# Patient Record
Sex: FEMALE | Race: WHITE | NOT HISPANIC OR LATINO | Employment: OTHER | URBAN - METROPOLITAN AREA
[De-identification: names, ages, dates, MRNs, and addresses within clinical notes are randomized per-mention and may not be internally consistent; named-entity substitution may affect disease eponyms.]

---

## 2018-02-02 ENCOUNTER — OFFICE VISIT (OUTPATIENT)
Dept: FAMILY MEDICINE CLINIC | Facility: CLINIC | Age: 48
End: 2018-02-02
Payer: COMMERCIAL

## 2018-02-02 VITALS
BODY MASS INDEX: 33.49 KG/M2 | RESPIRATION RATE: 16 BRPM | HEIGHT: 67 IN | TEMPERATURE: 97.9 F | SYSTOLIC BLOOD PRESSURE: 138 MMHG | HEART RATE: 62 BPM | WEIGHT: 213.4 LBS | DIASTOLIC BLOOD PRESSURE: 82 MMHG

## 2018-02-02 DIAGNOSIS — R39.15 URINARY URGENCY: Primary | ICD-10-CM

## 2018-02-02 DIAGNOSIS — R53.83 FATIGUE, UNSPECIFIED TYPE: ICD-10-CM

## 2018-02-02 DIAGNOSIS — R31.29 MICROSCOPIC HEMATURIA: ICD-10-CM

## 2018-02-02 DIAGNOSIS — Z13.220 SCREENING FOR CHOLESTEROL LEVEL: ICD-10-CM

## 2018-02-02 DIAGNOSIS — R11.0 NAUSEA: ICD-10-CM

## 2018-02-02 DIAGNOSIS — R10.13 DYSPEPSIA: ICD-10-CM

## 2018-02-02 LAB
SL AMB  POCT GLUCOSE, UA: ABNORMAL
SL AMB LEUKOCYTE ESTERASE,UA: ABNORMAL
SL AMB POCT BILIRUBIN,UA: ABNORMAL
SL AMB POCT BLOOD,UA: ABNORMAL
SL AMB POCT CLARITY,UA: ABNORMAL
SL AMB POCT COLOR,UA: YELLOW
SL AMB POCT KETONES,UA: ABNORMAL
SL AMB POCT NITRITE,UA: ABNORMAL
SL AMB POCT PH,UA: 6
SL AMB POCT SPECIFIC GRAVITY,UA: 1.01
SL AMB POCT TOTAL PROTEIN: ABNORMAL
SL AMB POCT UROBILINOGEN: 0.2

## 2018-02-02 PROCEDURE — 99203 OFFICE O/P NEW LOW 30 MIN: CPT | Performed by: FAMILY MEDICINE

## 2018-02-02 PROCEDURE — 81002 URINALYSIS NONAUTO W/O SCOPE: CPT | Performed by: FAMILY MEDICINE

## 2018-02-02 RX ORDER — FAMOTIDINE 20 MG/1
20 TABLET, FILM COATED ORAL
Qty: 30 TABLET | Refills: 0 | COMMUNITY
Start: 2018-02-02

## 2018-02-02 RX ORDER — FAMOTIDINE 40 MG/1
20 TABLET, FILM COATED ORAL DAILY
COMMUNITY
End: 2018-02-02 | Stop reason: DRUGHIGH

## 2018-02-02 RX ORDER — CIPROFLOXACIN 500 MG/1
500 TABLET, FILM COATED ORAL 2 TIMES DAILY WITH MEALS
Qty: 14 TABLET | Refills: 0 | Status: SHIPPED | OUTPATIENT
Start: 2018-02-02 | End: 2018-02-09

## 2018-02-02 NOTE — PROGRESS NOTES
Assessment/Plan:    1  Urinary urgency/microscopic hematuria  - POCT urine dip  - ciprofloxacin (CIPRO) 500 mg tablet; Take 1 tablet (500 mg total) by mouth 2 (two) times a day with meals for 7 days  Dispense: 14 tablet; Refill: 0  - will obtain urine culture and call with results    2  Nausea  - CBC and differential; Future  - Comprehensive metabolic panel; Future  - CBC and differential  - Comprehensive metabolic panel    3  Dyspepsia  - famotidine (PEPCID) 20 mg tablet; Take 1 tablet (20 mg total) by mouth 2 (two) times a day before meals  Dispense: 30 tablet; Refill: 0  - avoid caffeine, spicy, fried or acidic food, ear small frequent meals, avoid eating 3 hours before bedtime    4  Screening for cholesterol level  - Lipid Panel with Direct LDL reflex; Future  - Lipid Panel with Direct LDL reflex       RTO in 2-3 weeks for yearly PE with labs done prior or sooner if symptoms persist or worsen  Subjective:      Patient ID: Mary Blue is a 50 y o  female  presents today with c/o intermittent epigastric/ left upper abdominal discomfort associated with nausea and belching for 2-3 weeks  She also developed urinary urgency and lower back discomfort 3 days ago, she had a low grade fever on Wednesday night 100 1, she has been having normal soft stools once daily, no pain or burning with urination, no urinary frequency, no vomiting, no diarrhea, no constipation, no melena/ hematochezia, no skin rash, no anorexia, no unexplained weight changes, no trouble swallowing, no hx of kidney stones  Pt states she started taking Pepcid yesterday with mild relief of her symptoms             The following portions of the patient's history were reviewed and updated as appropriate: allergies, current medications, past family history, past medical history, past social history, past surgical history and problem list     Review of Systems   Constitutional: Negative for activity change, appetite change, chills, fatigue and unexpected weight change  HENT: Negative for congestion, ear pain, nosebleeds, rhinorrhea, sore throat and trouble swallowing  Respiratory: Negative for cough, chest tightness, shortness of breath and wheezing  Cardiovascular: Negative for chest pain, palpitations and leg swelling  Gastrointestinal: Negative for blood in stool, constipation, diarrhea, nausea and vomiting  As noted in HPI   Genitourinary: Positive for urgency  Negative for difficulty urinating, dysuria, frequency, vaginal bleeding, vaginal discharge and vaginal pain  As noted in HPI   Skin: Negative for rash  Neurological: Negative for dizziness, syncope and headaches  Objective:  Vitals:    02/02/18 1510   BP: 138/82   BP Location: Left arm   Patient Position: Sitting   Cuff Size: Adult   Pulse: 62   Resp: 16   Temp: 97 9 °F (36 6 °C)   Weight: 96 8 kg (213 lb 6 4 oz)   Height: 5' 7" (1 702 m)      Physical Exam   Constitutional: She appears well-developed and well-nourished  No distress  HENT:   Right Ear: Tympanic membrane and external ear normal    Left Ear: Tympanic membrane and external ear normal    Nose: Nose normal    Mouth/Throat: Oropharynx is clear and moist    Neck: Neck supple  No thyromegaly present  Cardiovascular: Normal rate, regular rhythm and normal heart sounds  No murmur heard  Pulmonary/Chest: Effort normal and breath sounds normal  She has no wheezes  She has no rales  Abdominal: Soft  Bowel sounds are normal  She exhibits no distension and no mass  There is no tenderness  There is no rebound, no guarding and no CVA tenderness  Lymphadenopathy:     She has no cervical adenopathy  Skin: No rash noted

## 2018-02-04 LAB
BACTERIA UR CULT: NORMAL
Lab: NO GROWTH

## 2018-02-10 LAB
ALBUMIN SERPL-MCNC: 4.1 G/DL (ref 3.5–5.5)
ALBUMIN/GLOB SERPL: 1.6 {RATIO} (ref 1.2–2.2)
ALP SERPL-CCNC: 60 IU/L (ref 39–117)
ALT SERPL-CCNC: 13 IU/L (ref 0–32)
AST SERPL-CCNC: 17 IU/L (ref 0–40)
BASOPHILS # BLD AUTO: 0 X10E3/UL (ref 0–0.2)
BASOPHILS NFR BLD AUTO: 1 %
BILIRUB SERPL-MCNC: 0.9 MG/DL (ref 0–1.2)
BUN SERPL-MCNC: 17 MG/DL (ref 6–24)
BUN/CREAT SERPL: 16 (ref 9–23)
CALCIUM SERPL-MCNC: 9 MG/DL (ref 8.7–10.2)
CHLORIDE SERPL-SCNC: 102 MMOL/L (ref 96–106)
CHOLEST SERPL-MCNC: 142 MG/DL (ref 100–199)
CO2 SERPL-SCNC: 27 MMOL/L (ref 18–29)
CREAT SERPL-MCNC: 1.08 MG/DL (ref 0.57–1)
EOSINOPHIL # BLD AUTO: 0.2 X10E3/UL (ref 0–0.4)
EOSINOPHIL NFR BLD AUTO: 5 %
ERYTHROCYTE [DISTWIDTH] IN BLOOD BY AUTOMATED COUNT: 12.9 % (ref 12.3–15.4)
GLOBULIN SER-MCNC: 2.5 G/DL (ref 1.5–4.5)
GLUCOSE SERPL-MCNC: 87 MG/DL (ref 65–99)
HCT VFR BLD AUTO: 43.3 % (ref 34–46.6)
HDLC SERPL-MCNC: 36 MG/DL
HGB BLD-MCNC: 14.7 G/DL (ref 11.1–15.9)
IMM GRANULOCYTES # BLD: 0 X10E3/UL (ref 0–0.1)
IMM GRANULOCYTES NFR BLD: 0 %
LDLC SERPL CALC-MCNC: 94 MG/DL (ref 0–99)
LDLC/HDLC SERPL: 2.6 RATIO UNITS (ref 0–3.2)
LYMPHOCYTES # BLD AUTO: 1.4 X10E3/UL (ref 0.7–3.1)
LYMPHOCYTES NFR BLD AUTO: 30 %
MCH RBC QN AUTO: 31.7 PG (ref 26.6–33)
MCHC RBC AUTO-ENTMCNC: 33.9 G/DL (ref 31.5–35.7)
MCV RBC AUTO: 94 FL (ref 79–97)
MONOCYTES # BLD AUTO: 0.5 X10E3/UL (ref 0.1–0.9)
MONOCYTES NFR BLD AUTO: 11 %
NEUTROPHILS # BLD AUTO: 2.5 X10E3/UL (ref 1.4–7)
NEUTROPHILS NFR BLD AUTO: 53 %
PLATELET # BLD AUTO: 180 X10E3/UL (ref 150–379)
POTASSIUM SERPL-SCNC: 3.9 MMOL/L (ref 3.5–5.2)
PROT SERPL-MCNC: 6.6 G/DL (ref 6–8.5)
RBC # BLD AUTO: 4.63 X10E6/UL (ref 3.77–5.28)
SL AMB EGFR AFRICAN AMERICAN: 70 ML/MIN/1.73
SL AMB EGFR NON AFRICAN AMERICAN: 60 ML/MIN/1.73
SL AMB VLDL CHOLESTEROL CALC: 12 MG/DL (ref 5–40)
SODIUM SERPL-SCNC: 141 MMOL/L (ref 134–144)
TRIGL SERPL-MCNC: 62 MG/DL (ref 0–149)
TSH SERPL DL<=0.005 MIU/L-ACNC: 1.18 UIU/ML (ref 0.45–4.5)
WBC # BLD AUTO: 4.7 X10E3/UL (ref 3.4–10.8)

## 2018-02-16 ENCOUNTER — OFFICE VISIT (OUTPATIENT)
Dept: FAMILY MEDICINE CLINIC | Facility: CLINIC | Age: 48
End: 2018-02-16
Payer: COMMERCIAL

## 2018-02-16 VITALS
SYSTOLIC BLOOD PRESSURE: 118 MMHG | WEIGHT: 215.6 LBS | DIASTOLIC BLOOD PRESSURE: 64 MMHG | HEIGHT: 67 IN | BODY MASS INDEX: 33.84 KG/M2 | RESPIRATION RATE: 14 BRPM | HEART RATE: 72 BPM | TEMPERATURE: 97.9 F

## 2018-02-16 DIAGNOSIS — Z00.00 PE (PHYSICAL EXAM), ANNUAL: ICD-10-CM

## 2018-02-16 DIAGNOSIS — R31.9 HEMATURIA, UNSPECIFIED TYPE: Primary | ICD-10-CM

## 2018-02-16 LAB
SL AMB  POCT GLUCOSE, UA: NORMAL
SL AMB LEUKOCYTE ESTERASE,UA: NORMAL
SL AMB POCT BILIRUBIN,UA: NORMAL
SL AMB POCT BLOOD,UA: NORMAL
SL AMB POCT CLARITY,UA: CLEAR
SL AMB POCT COLOR,UA: YELLOW
SL AMB POCT KETONES,UA: NORMAL
SL AMB POCT NITRITE,UA: NORMAL
SL AMB POCT PH,UA: 5
SL AMB POCT SPECIFIC GRAVITY,UA: 1
SL AMB POCT URINE PROTEIN: NORMAL
SL AMB POCT UROBILINOGEN: 0.2

## 2018-02-16 PROCEDURE — 99396 PREV VISIT EST AGE 40-64: CPT | Performed by: NURSE PRACTITIONER

## 2018-02-16 PROCEDURE — 81002 URINALYSIS NONAUTO W/O SCOPE: CPT | Performed by: NURSE PRACTITIONER

## 2018-02-16 NOTE — PROGRESS NOTES
Chief Complaint   Patient presents with    Follow-up     still on bland diet- BW done 02/09/18     Assessment/Plan:    1  PE (physical exam), annual  This was conducted  And your blood work was reviewed  Please increase exercise to include strength training as well as cardio  Please have you r old records transferred and we will check to see TDaP date as well as next colon  2  Hematuria, unspecified type   we will hossein the urine for blood  If this is still present we will refer you to uro for evaluation  We will call with the results   - POCT urine dip  - Urine culture        Subjective:      Patient ID: Hema Alston is a 50 y o  female  Here today for general PE and to follow up on labs that were done  Admits that she watches her carbs but does not really exercise  Has a vary busy life and schedule and has always felt that was enough  Denies any fatigue   Had urine done last ov for abdominal pain and was started on Cipro for blood in the urine  Did take all of the cipro no menses  Is unsure lat TDaP but has records at home  Mom with stage 3 colon ca and pt did have 2 colonoscopies  Thinks she is due in 3-4 years for the next  No past medical history on file  Past Surgical History:   Procedure Laterality Date    DILATION AND CURETTAGE OF UTERUS       Family History   Problem Relation Age of Onset    Colon cancer Mother     Depression Mother     Substance Abuse Father      Social History   Social History     Social History    Marital status: /Civil Union     Spouse name: N/A    Number of children: N/A    Years of education: N/A     Occupational History    Not on file  Social History Main Topics    Smoking status: Never Smoker    Smokeless tobacco: Never Used    Alcohol use No    Drug use: No    Sexual activity: Not on file     Other Topics Concern    Not on file     Social History Narrative    No narrative on file     Review of Systems   Constitutional: Negative  HENT: Negative  Eyes: Negative  Respiratory: Negative  Cardiovascular: Negative  Gastrointestinal: Negative  Endocrine: Negative  Genitourinary: Negative  Musculoskeletal: Negative  Allergic/Immunologic: Negative  Neurological: Negative  Hematological: Negative  Vitals:    02/16/18 1356   BP: 118/64   BP Location: Right arm   Patient Position: Sitting   Cuff Size: Adult   Pulse: 72   Resp: 14   Temp: 97 9 °F (36 6 °C)   TempSrc: Oral   Weight: 97 8 kg (215 lb 9 6 oz)   Height: 5' 7" (1 702 m)       Objective:         Physical Exam   Constitutional: She is oriented to person, place, and time  She appears well-developed and well-nourished  Neck: Neck supple  Cardiovascular: Normal rate, regular rhythm and normal heart sounds  Pulmonary/Chest: Effort normal and breath sounds normal    Abdominal: Soft  Bowel sounds are normal    Musculoskeletal: Normal range of motion  Neurological: She is alert and oriented to person, place, and time  Skin: Skin is warm and dry  Psychiatric: She has a normal mood and affect   Her behavior is normal  Judgment and thought content normal

## 2018-02-18 LAB
BACTERIA UR CULT: NORMAL
Lab: NO GROWTH

## 2018-02-20 ENCOUNTER — TELEPHONE (OUTPATIENT)
Dept: FAMILY MEDICINE CLINIC | Facility: CLINIC | Age: 48
End: 2018-02-20

## 2018-02-20 NOTE — TELEPHONE ENCOUNTER
I sent it to nicole adam I thought she would know better the clinical information to print and send

## 2018-02-20 NOTE — TELEPHONE ENCOUNTER
Caller is from urology dr Berenice aguilar's office  She needs any office notes and tests done on pt   This can be faxed to [phone]

## 2018-02-28 ENCOUNTER — TRANSCRIBE ORDERS (OUTPATIENT)
Dept: ADMINISTRATIVE | Facility: HOSPITAL | Age: 48
End: 2018-02-28

## 2018-02-28 DIAGNOSIS — R31.29 MICROSCOPIC HEMATURIA: Primary | ICD-10-CM

## 2018-03-09 ENCOUNTER — HOSPITAL ENCOUNTER (OUTPATIENT)
Dept: ULTRASOUND IMAGING | Facility: CLINIC | Age: 48
Discharge: HOME | End: 2018-03-09
Payer: COMMERCIAL

## 2018-03-09 DIAGNOSIS — R31.29 MICROSCOPIC HEMATURIA: ICD-10-CM

## 2018-03-09 PROCEDURE — 76770 US EXAM ABDO BACK WALL COMP: CPT

## 2018-10-09 DIAGNOSIS — Z01.84 IMMUNITY STATUS TESTING: Primary | ICD-10-CM

## 2018-10-21 LAB
HBV SURFACE AB SER-ACNC: <3.1 MIU/ML
MEV IGG SER IA-ACNC: 121 AU/ML
MUV IGG SER IA-ACNC: <9 AU/ML
RUBV IGG SERPL IA-ACNC: 4.02 INDEX
VZV IGG SER IA-ACNC: 953 INDEX
VZV IGM SER IA-ACNC: <0.91 INDEX (ref 0–0.9)

## 2018-10-22 ENCOUNTER — TELEPHONE (OUTPATIENT)
Dept: FAMILY MEDICINE CLINIC | Facility: CLINIC | Age: 48
End: 2018-10-22

## 2018-10-22 NOTE — TELEPHONE ENCOUNTER
Pt  Informed via VM           ----- Message from Rob Shane, Car Matteo  sent at 10/22/2018  1:41 PM EDT -----  Call Tyler Paulvashti and let her know that she is not immune to mumps or to Hep B>   She will need an MMR vaccine and we can give that to her here  If she has had the Hep series in the past we can booster with 1 hep b  If she has never had the hep b series she will need to do all three

## 2018-10-26 ENCOUNTER — CLINICAL SUPPORT (OUTPATIENT)
Dept: FAMILY MEDICINE CLINIC | Facility: CLINIC | Age: 48
End: 2018-10-26
Payer: COMMERCIAL

## 2018-10-26 DIAGNOSIS — Z23 NEED FOR TDAP VACCINATION: ICD-10-CM

## 2018-10-26 DIAGNOSIS — Z23 NEED FOR MMR VACCINE: ICD-10-CM

## 2018-10-26 DIAGNOSIS — Z23 NEED FOR PROPHYLACTIC VACCINATION AND INOCULATION AGAINST INFLUENZA: Primary | ICD-10-CM

## 2018-10-26 DIAGNOSIS — Z23 NEED FOR TUBERCULOSIS VACCINATION: ICD-10-CM

## 2018-10-26 DIAGNOSIS — Z23 NEED FOR HEPATITIS B VACCINATION: ICD-10-CM

## 2018-10-26 PROCEDURE — 90472 IMMUNIZATION ADMIN EACH ADD: CPT

## 2018-10-26 PROCEDURE — 90707 MMR VACCINE SC: CPT

## 2018-10-26 PROCEDURE — 90715 TDAP VACCINE 7 YRS/> IM: CPT

## 2018-10-26 PROCEDURE — 90471 IMMUNIZATION ADMIN: CPT

## 2018-10-26 PROCEDURE — 90746 HEPB VACCINE 3 DOSE ADULT IM: CPT

## 2018-10-26 PROCEDURE — 90682 RIV4 VACC RECOMBINANT DNA IM: CPT

## 2018-10-26 PROCEDURE — 86580 TB INTRADERMAL TEST: CPT

## 2018-11-09 ENCOUNTER — CLINICAL SUPPORT (OUTPATIENT)
Dept: FAMILY MEDICINE CLINIC | Facility: CLINIC | Age: 48
End: 2018-11-09
Payer: COMMERCIAL

## 2018-11-09 DIAGNOSIS — Z11.1 SCREENING FOR TUBERCULOSIS: Primary | ICD-10-CM

## 2018-11-09 PROCEDURE — 86580 TB INTRADERMAL TEST: CPT

## 2018-11-12 LAB
INDURATION: 0 MM
TB SKIN TEST: NEGATIVE

## 2020-08-18 ENCOUNTER — OFFICE VISIT (OUTPATIENT)
Dept: FAMILY MEDICINE CLINIC | Facility: CLINIC | Age: 50
End: 2020-08-18
Payer: COMMERCIAL

## 2020-08-18 VITALS
SYSTOLIC BLOOD PRESSURE: 122 MMHG | RESPIRATION RATE: 16 BRPM | DIASTOLIC BLOOD PRESSURE: 82 MMHG | BODY MASS INDEX: 34.7 KG/M2 | HEART RATE: 66 BPM | HEIGHT: 67 IN | WEIGHT: 221.1 LBS | TEMPERATURE: 98.7 F

## 2020-08-18 DIAGNOSIS — M79.644 PAIN OF FINGER OF RIGHT HAND: ICD-10-CM

## 2020-08-18 DIAGNOSIS — Z12.39 SCREENING FOR MALIGNANT NEOPLASM OF BREAST: ICD-10-CM

## 2020-08-18 DIAGNOSIS — Z00.00 PE (PHYSICAL EXAM), ANNUAL: Primary | ICD-10-CM

## 2020-08-18 PROCEDURE — 3008F BODY MASS INDEX DOCD: CPT | Performed by: NURSE PRACTITIONER

## 2020-08-18 PROCEDURE — 99396 PREV VISIT EST AGE 40-64: CPT | Performed by: NURSE PRACTITIONER

## 2020-08-18 PROCEDURE — 1036F TOBACCO NON-USER: CPT | Performed by: NURSE PRACTITIONER

## 2020-08-18 PROCEDURE — 3725F SCREEN DEPRESSION PERFORMED: CPT | Performed by: NURSE PRACTITIONER

## 2020-08-18 NOTE — PROGRESS NOTES
Estefany Salgado is a 44 y o   female and is here for routine health maintenance  The patient reports no problems  Cancer Screening  Colononoscopy due and will schedule   Mammogram due 2021  Pap utd  Abnormal pap? no  Smoker never          Immunization History   Administered Date(s) Administered    Hep B, adult 10/26/2018    INFLUENZA 01/12/2018, 10/26/2018    Influenza, recombinant, quadrivalent,injectable, preservative free 10/26/2018    MMR 10/26/2018    Tdap 10/26/2018    Tuberculin Skin Test-PPD Intradermal 10/26/2018, 11/09/2018        History:  LMP: No LMP recorded  Dentist Visit within 6-12 months        The following portions of the patient's history were reviewed and updated as appropriate: allergies, current medications, past family history, past medical history, past social history, past surgical history and problem list       Review of Systems  Review of Systems   Constitutional: Negative  HENT: Negative  Eyes: Negative  Respiratory: Negative  Cardiovascular: Negative  Gastrointestinal: Negative  Endocrine: Negative  Genitourinary: Negative  Musculoskeletal: Negative  Skin: Negative  Allergic/Immunologic: Negative  Neurological: Negative  Hematological: Negative  Psychiatric/Behavioral: Negative  Objective   Vitals:    08/18/20 1207   BP: 122/82   Pulse: 66   Resp: 16   Temp: 98 7 °F (37 1 °C)     Wt Readings from Last 3 Encounters:   08/18/20 100 kg (221 lb 1 6 oz)   02/16/18 97 8 kg (215 lb 9 6 oz)   02/02/18 96 8 kg (213 lb 6 4 oz)     BP Readings from Last 3 Encounters:   08/18/20 122/82   02/16/18 118/64   02/02/18 138/82     Pulse Readings from Last 3 Encounters:   08/18/20 66   02/16/18 72   02/02/18 62     BMI Readings from Last 3 Encounters:   08/18/20 34 63 kg/m²   02/16/18 33 77 kg/m²   02/02/18 33 42 kg/m²     Physical Exam  Constitutional:       Appearance: She is well-developed  HENT:      Head: Normocephalic  Eyes:      Pupils: Pupils are equal, round, and reactive to light  Neck:      Musculoskeletal: Normal range of motion and neck supple  Cardiovascular:      Rate and Rhythm: Normal rate and regular rhythm  Pulmonary:      Effort: Pulmonary effort is normal       Breath sounds: Normal breath sounds  Abdominal:      General: Bowel sounds are normal       Palpations: Abdomen is soft  Musculoskeletal: Normal range of motion  Skin:     General: Skin is warm  Neurological:      Mental Status: She is alert and oriented to person, place, and time  Psychiatric:         Behavior: Behavior normal          Thought Content: Thought content normal          Judgment: Judgment normal          Assessment/Plan     Healthy female exam     1  PE  2  Patient Counseling:  --Nutrition: Stressed importance of moderation in sodium/caffeine intake, saturated fat and cholesterol, caloric balance, sufficient intake of fresh fruits, vegetables, fiber, calcium, iron  --Exercise: Stressed the importance of regular exercise  --Injury prevention: Discussed safety belts, safety helmets, smoke detector, smoking near bedding or upholstery  --Dental health: Discussed importance of regular tooth brushing, flossing, and dental visits  --Immunizations reviewed  --Discussed benefits of screening colonoscopy  --After hours service discussed with patient    3  Cancer Screening is UTD   4  Labs none needed   5  TDap is UTD  6  Follow up as needed for acute illness  BMI Counseling: Body mass index is 34 63 kg/m²  The BMI is above normal  Nutrition recommendations include reducing portion sizes, decreasing overall calorie intake and 3-5 servings of fruits/vegetables daily  Exercise recommendations include moderate aerobic physical activity for 150 minutes/week

## 2020-11-19 ENCOUNTER — OFFICE VISIT (OUTPATIENT)
Dept: FAMILY MEDICINE CLINIC | Facility: CLINIC | Age: 50
End: 2020-11-19
Payer: COMMERCIAL

## 2020-11-19 VITALS
RESPIRATION RATE: 14 BRPM | HEIGHT: 66 IN | SYSTOLIC BLOOD PRESSURE: 118 MMHG | BODY MASS INDEX: 35.52 KG/M2 | WEIGHT: 221 LBS | TEMPERATURE: 97.8 F | HEART RATE: 68 BPM | DIASTOLIC BLOOD PRESSURE: 80 MMHG

## 2020-11-19 DIAGNOSIS — Z01.84 IMMUNITY STATUS TESTING: ICD-10-CM

## 2020-11-19 DIAGNOSIS — Z23 NEED FOR VACCINATION: Primary | ICD-10-CM

## 2020-11-19 PROCEDURE — 90682 RIV4 VACC RECOMBINANT DNA IM: CPT

## 2020-11-19 PROCEDURE — 90471 IMMUNIZATION ADMIN: CPT

## 2020-11-19 PROCEDURE — 3725F SCREEN DEPRESSION PERFORMED: CPT | Performed by: NURSE PRACTITIONER

## 2020-11-19 PROCEDURE — 1036F TOBACCO NON-USER: CPT | Performed by: NURSE PRACTITIONER

## 2020-11-19 PROCEDURE — 99213 OFFICE O/P EST LOW 20 MIN: CPT | Performed by: NURSE PRACTITIONER

## 2020-11-19 PROCEDURE — 3008F BODY MASS INDEX DOCD: CPT | Performed by: NURSE PRACTITIONER

## 2020-11-20 LAB
HBV SURFACE AB SER-ACNC: <3.1 MIU/ML
MEV IGG SER IA-ACNC: 172 AU/ML
MUV IGG SER IA-ACNC: <9 AU/ML
RUBV IGG SERPL IA-ACNC: 7.7 INDEX
VZV IGG SER IA-ACNC: 1040 INDEX

## 2020-11-23 ENCOUNTER — TELEPHONE (OUTPATIENT)
Dept: FAMILY MEDICINE CLINIC | Facility: CLINIC | Age: 50
End: 2020-11-23

## 2020-11-24 ENCOUNTER — CLINICAL SUPPORT (OUTPATIENT)
Dept: FAMILY MEDICINE CLINIC | Facility: CLINIC | Age: 50
End: 2020-11-24
Payer: COMMERCIAL

## 2020-11-24 DIAGNOSIS — Z23 NEED FOR VACCINATION: Primary | ICD-10-CM

## 2020-11-24 PROCEDURE — 86580 TB INTRADERMAL TEST: CPT

## 2020-11-27 ENCOUNTER — CLINICAL SUPPORT (OUTPATIENT)
Dept: FAMILY MEDICINE CLINIC | Facility: CLINIC | Age: 50
End: 2020-11-27

## 2020-11-27 DIAGNOSIS — Z11.1 ENCOUNTER FOR PPD SKIN TEST READING: Primary | ICD-10-CM

## 2020-11-27 LAB
INDURATION: 0 MM
TB SKIN TEST: NEGATIVE

## 2020-12-01 ENCOUNTER — CLINICAL SUPPORT (OUTPATIENT)
Dept: FAMILY MEDICINE CLINIC | Facility: CLINIC | Age: 50
End: 2020-12-01
Payer: COMMERCIAL

## 2020-12-01 DIAGNOSIS — Z23 NEED FOR HEPATITIS B BOOSTER VACCINATION: Primary | ICD-10-CM

## 2020-12-01 DIAGNOSIS — Z11.1 SCREENING FOR TUBERCULOSIS: ICD-10-CM

## 2020-12-01 DIAGNOSIS — Z23 NEED FOR MEASLES-MUMPS-RUBELLA (MMR) VACCINE: ICD-10-CM

## 2020-12-01 PROCEDURE — 90471 IMMUNIZATION ADMIN: CPT

## 2020-12-01 PROCEDURE — 90746 HEPB VACCINE 3 DOSE ADULT IM: CPT

## 2020-12-01 PROCEDURE — 86580 TB INTRADERMAL TEST: CPT

## 2020-12-01 PROCEDURE — 90707 MMR VACCINE SC: CPT

## 2020-12-01 PROCEDURE — 90472 IMMUNIZATION ADMIN EACH ADD: CPT

## 2020-12-03 ENCOUNTER — CLINICAL SUPPORT (OUTPATIENT)
Dept: FAMILY MEDICINE CLINIC | Facility: CLINIC | Age: 50
End: 2020-12-03

## 2020-12-03 DIAGNOSIS — Z11.1 ENCOUNTER FOR PPD SKIN TEST READING: Primary | ICD-10-CM

## 2020-12-03 LAB
INDURATION: 0 MM
TB SKIN TEST: NEGATIVE

## 2021-01-04 ENCOUNTER — CLINICAL SUPPORT (OUTPATIENT)
Dept: FAMILY MEDICINE CLINIC | Facility: CLINIC | Age: 51
End: 2021-01-04
Payer: COMMERCIAL

## 2021-01-04 DIAGNOSIS — Z23 NEED FOR HEPATITIS B BOOSTER VACCINATION: Primary | ICD-10-CM

## 2021-01-04 PROCEDURE — 90746 HEPB VACCINE 3 DOSE ADULT IM: CPT | Performed by: FAMILY MEDICINE

## 2021-01-04 PROCEDURE — 90471 IMMUNIZATION ADMIN: CPT | Performed by: FAMILY MEDICINE

## 2021-01-29 ENCOUNTER — TELEPHONE (OUTPATIENT)
Dept: FAMILY MEDICINE CLINIC | Facility: CLINIC | Age: 51
End: 2021-01-29

## 2021-01-29 DIAGNOSIS — Z01.84 IMMUNITY STATUS TESTING: Primary | ICD-10-CM

## 2021-01-29 NOTE — TELEPHONE ENCOUNTER
Patient is calling because she needs to get blood work to check levels for her MMR  Patient goes to [facility] for [facility] work  Third hep B can I schedule that with her TB? Has 2nd 1/4

## 2021-01-29 NOTE — TELEPHONE ENCOUNTER
Needs third Hep B 6/2021  If is 6 months after first dose which was 12/2021  But yes, TB and Hep B can be scheduled together  MMR labs ordered

## 2021-02-05 ENCOUNTER — TELEPHONE (OUTPATIENT)
Dept: FAMILY MEDICINE CLINIC | Facility: CLINIC | Age: 51
End: 2021-02-05

## 2021-02-05 LAB
MEV IGG SER IA-ACNC: 149 AU/ML
MUV IGG SER IA-ACNC: <9 AU/ML
RUBV IGG SERPL IA-ACNC: 8.6 INDEX

## 2021-02-05 NOTE — TELEPHONE ENCOUNTER
Torstenom regarding blood work results for immunity and finding out the next step from her school   MRP

## 2021-02-05 NOTE — TELEPHONE ENCOUNTER
----- Message from Linda Orr PA-C sent at 2/5/2021  8:37 AM EST -----  Please let patient know she is still not immune to mumps, I recommend following back up with the school that is requesting the immunization regarding what they would like her to do next (restart whole vaccine, booster, ect)

## 2021-04-22 ENCOUNTER — HOSPITAL ENCOUNTER (OUTPATIENT)
Dept: NON INVASIVE DIAGNOSTICS | Facility: HOSPITAL | Age: 51
Discharge: HOME | End: 2021-04-22
Payer: COMMERCIAL

## 2021-04-22 ENCOUNTER — OFFICE VISIT (OUTPATIENT)
Dept: FAMILY MEDICINE CLINIC | Facility: CLINIC | Age: 51
End: 2021-04-22
Payer: COMMERCIAL

## 2021-04-22 VITALS
HEART RATE: 80 BPM | BODY MASS INDEX: 34.55 KG/M2 | HEIGHT: 67 IN | WEIGHT: 220.1 LBS | SYSTOLIC BLOOD PRESSURE: 134 MMHG | DIASTOLIC BLOOD PRESSURE: 84 MMHG | TEMPERATURE: 98 F | RESPIRATION RATE: 18 BRPM

## 2021-04-22 DIAGNOSIS — Z12.11 SCREENING FOR MALIGNANT NEOPLASM OF COLON: ICD-10-CM

## 2021-04-22 DIAGNOSIS — L81.9 DISCOLORATION OF SKIN OF TOE: ICD-10-CM

## 2021-04-22 DIAGNOSIS — I83.891 VARICOSE VEINS OF LEG WITH SWELLING, RIGHT: ICD-10-CM

## 2021-04-22 DIAGNOSIS — R60.0 EDEMA OF RIGHT LOWER EXTREMITY: Primary | ICD-10-CM

## 2021-04-22 PROCEDURE — 93971 EXTREMITY STUDY: CPT

## 2021-04-22 PROCEDURE — 1036F TOBACCO NON-USER: CPT | Performed by: NURSE PRACTITIONER

## 2021-04-22 PROCEDURE — 99213 OFFICE O/P EST LOW 20 MIN: CPT | Performed by: NURSE PRACTITIONER

## 2021-04-22 PROCEDURE — 3008F BODY MASS INDEX DOCD: CPT | Performed by: NURSE PRACTITIONER

## 2021-04-22 PROCEDURE — 93971 EXTREMITY STUDY: CPT | Performed by: SURGERY

## 2021-04-22 NOTE — PROGRESS NOTES
Assessment/Plan:     Diagnoses and all orders for this visit:    Edema of right lower extremity  -     Ambulatory referral to Vascular Surgery; Future  -     VAS lower limb venous duplex study, unilateral/limited; Future    This has been ongoing since December  Family hx of blood clots  Negative Homans sign  Trace edema noted RLE  No pain or redness in RLE  Anterior shin varicose veins noted  Per pt they have been there for at least 20 years  Venous Duplex negative for clot  Varicose veins of leg with swelling, right  -     VAS lower limb venous duplex study, unilateral/limited; Future    Present on juanjo LE, most prominent one spans vertically on right anterior shin  Pt previously was seen by vascular in past 20 years ago  Per pt report, vascular did not want to intervene until pt was done having her kids  Now patient would like to see what her treatment options are for this  They do not cause her pain when sitting, but are aggravated with activity  Screening for malignant neoplasm of colon  -     Ambulatory referral to Gastroenterology; Future    Discoloration of skin of toe    Blue discoloration on right 3rd digit toe only when dependent  When elevated, toe is normal color  Pt has full sensation in juanjo feet & toes  Encouraged her to elevate this in the evening when able  Continue juanjo compression socks  Venous duplex negative  Referral to Vascular placed  Pt to F/u PRN  Venous Duplex RLE negative  We will consult Vascular Surgery to see what patient's options are  Subjective:      Patient ID: Jamarcus Grey is a 44 y o  female  Pt presents for concerns over her right 3rd digit toe that will turn blue color when dependent  Pt states she previously had infection in this toe back in December and was able to treat it at home with epsom salt soaks  Earlier this year she was seen by a podiatrist for concerns of toenail fungus given nail bed yellowish discoloration and thickening    Per pt report, samples were taken & it was found that she did not have a fungal infection  Her podiatrist noted her discoloration of her right 3rd toe & encouraged her to have it looked at by her doctor  Pt does have hx of varicose veins at least x 20 years  She has her most prominent one in her right anterior shin  She admits to pain in this location when she is on her feet for long  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  Pt is interested in having her varicose veins fixed & her toe position-dependent discoloration looked at  The following portions of the patient's history were reviewed and updated as appropriate: allergies, current medications, past family history, past medical history, past social history, past surgical history and problem list     Review of Systems   Constitutional: Negative  Negative for chills and fever  HENT: Negative  Negative for ear pain and sore throat  Eyes: Negative  Negative for pain and visual disturbance  Respiratory: Negative  Negative for cough and shortness of breath  Cardiovascular: Positive for leg swelling (RLE since December)  Negative for chest pain and palpitations  Gastrointestinal: Negative  Negative for abdominal distention, abdominal pain, constipation, diarrhea, nausea and vomiting  Genitourinary: Negative  Negative for dysuria and hematuria  Musculoskeletal: Negative  Negative for arthralgias and back pain  Skin: Negative  Negative for color change and rash  Neurological: Negative  Negative for dizziness, seizures, syncope and headaches  Psychiatric/Behavioral: The patient is nervous/anxious  All other systems reviewed and are negative  Objective:      /84   Pulse 80   Temp 98 °F (36 7 °C) (Oral)   Resp 18   Ht 5' 7" (1 702 m)   Wt 99 8 kg (220 lb 1 6 oz)   BMI 34 47 kg/m²          Physical Exam  Vitals signs reviewed  Constitutional:       Appearance: Normal appearance  HENT:      Head: Normocephalic  Neck:      Musculoskeletal: Normal range of motion  Vascular: No carotid bruit  Cardiovascular:      Rate and Rhythm: Normal rate and regular rhythm  Pulses:           Dorsalis pedis pulses are 1+ on the right side and 1+ on the left side  Posterior tibial pulses are 2+ on the right side and 2+ on the left side  Heart sounds: Normal heart sounds  No murmur  Pulmonary:      Effort: Pulmonary effort is normal       Breath sounds: Normal breath sounds  No wheezing  Abdominal:      Palpations: Abdomen is soft  Musculoskeletal: Normal range of motion  General: No tenderness  Right lower leg: Edema (trace edema RLE) present  Left lower leg: No edema  Right foot: Normal range of motion  No deformity or foot drop  Left foot: Normal range of motion  No deformity or foot drop  Feet:      Right foot:      Protective Sensation: 10 sites tested  10 sites sensed  Skin integrity: Dry skin present  No ulcer, blister, skin breakdown or erythema  Toenail Condition: Right toenails are abnormally thick  Left foot:      Protective Sensation: 10 sites tested  10 sites sensed  Skin integrity: Dry skin present  No ulcer, blister, skin breakdown or erythema  Toenail Condition: Left toenails are abnormally thick  Lymphadenopathy:      Cervical: No cervical adenopathy  Skin:     General: Skin is warm and dry  Capillary Refill: Capillary refill takes less than 2 seconds  Findings: No bruising, erythema or rash  Neurological:      General: No focal deficit present  Mental Status: She is alert and oriented to person, place, and time  Mental status is at baseline  Sensory: No sensory deficit  Motor: No weakness  Coordination: Coordination normal       Gait: Gait normal    Psychiatric:         Mood and Affect: Mood normal          Behavior: Behavior normal          Thought Content:  Thought content normal          Judgment: Judgment normal

## 2021-05-06 ENCOUNTER — CONSULT (OUTPATIENT)
Dept: VASCULAR SURGERY | Facility: CLINIC | Age: 51
End: 2021-05-06
Payer: COMMERCIAL

## 2021-05-06 VITALS
TEMPERATURE: 97.1 F | HEIGHT: 67 IN | WEIGHT: 220 LBS | BODY MASS INDEX: 34.53 KG/M2 | HEART RATE: 74 BPM | DIASTOLIC BLOOD PRESSURE: 78 MMHG | SYSTOLIC BLOOD PRESSURE: 138 MMHG | RESPIRATION RATE: 17 BRPM

## 2021-05-06 DIAGNOSIS — I83.891 VARICOSE VEINS OF RIGHT LEG WITH EDEMA: ICD-10-CM

## 2021-05-06 PROCEDURE — 99204 OFFICE O/P NEW MOD 45 MIN: CPT | Performed by: PHYSICIAN ASSISTANT

## 2021-05-06 PROCEDURE — 1036F TOBACCO NON-USER: CPT | Performed by: PHYSICIAN ASSISTANT

## 2021-05-06 PROCEDURE — 3008F BODY MASS INDEX DOCD: CPT | Performed by: PHYSICIAN ASSISTANT

## 2021-05-06 NOTE — PROGRESS NOTES
Assessment/Plan:    Varicose veins of right leg with edema  44year old female with no significant PMHx presenting with symptomatic varicose veins of the RLE  -Symptoms include pain, heaviness and irritation   -Truncal varicosities noted on the anterior right shin  -No wounds or skin changes noted   -Hx of poor nail growth and thick nail beds of the RLE  Palpable pedal pulses with no sign of arterial insuffiencey  Follow up with podiatry  -Discussed pathophysiology and treatment of venous insuffiencey and varicose veins   -Recommend conservative management trial for 3mo including compression stockings, lower extremity elevation, weight loss and exercise  -Will order reflux study for assessment with f/u with surgeon  -Patient in agreement with current plan  Diagnoses and all orders for this visit:    Varicose veins of right leg with edema  -     Ambulatory referral to Vascular Surgery  -     Compression Stocking  -     VAS reflux lower limb venous duplex study with reflux assesment, unilateral; Future          Subjective:      Patient ID: Mary Blue is a 44 y o  female  New patient presents in office for Discoloration of the right middle toe  Patient had a LEV done on 04/22/2021  Patient c o of having swelling bulging veins all in the right leg  Patient does wear compression stocking for the last 25 years  Patient states she has been having this for the last 5 months  44year old female with no significant past medical hx presenting with symptomatic varicose veins of the right lower extremity  Patient has had them for many years however they have started to bother her more recently  She also notes poor nail growth and thickened nails of the right toes  No symptoms to the left leg  Denies claudication or rest pain  No wounds or skin changes   Has not been wearing compression stockings or elevating her legs       The following portions of the patient's history were reviewed and updated as appropriate: allergies, current medications, past family history, past medical history, past social history, past surgical history and problem list     Review of Systems   Constitutional: Negative  HENT: Negative  Eyes: Negative  Respiratory: Negative  Cardiovascular: Positive for leg swelling  Gastrointestinal: Negative  Endocrine: Negative  Genitourinary: Negative  Musculoskeletal: Negative  Skin: Positive for color change (Right Toe )  Allergic/Immunologic: Negative  Neurological: Negative  Hematological: Negative  Psychiatric/Behavioral: Negative  I have reviewed and made appropriate changes to the review of systems input by the medical assistant      Vitals:    05/06/21 1134   BP: 138/78   BP Location: Left arm   Patient Position: Sitting   Cuff Size: Adult   Pulse: 74   Resp: 17   Temp: (!) 97 1 °F (36 2 °C)   TempSrc: Tympanic   Weight: 99 8 kg (220 lb)   Height: 5' 7" (1 702 m)       Patient Active Problem List   Diagnosis    Varicose veins of right leg with edema       Past Surgical History:   Procedure Laterality Date    DILATION AND CURETTAGE OF UTERUS         Family History   Problem Relation Age of Onset    Colon cancer Mother     Depression Mother     Substance Abuse Father     Mental illness Neg Hx        Social History     Socioeconomic History    Marital status: /Civil Union     Spouse name: Not on file    Number of children: Not on file    Years of education: Not on file    Highest education level: Not on file   Occupational History    Not on file   Social Needs    Financial resource strain: Not on file    Food insecurity     Worry: Not on file     Inability: Not on file   [employer] needs     Medical: Not on file     Non-medical: Not on file   Tobacco Use    Smoking status: Never Smoker    Smokeless tobacco: Never Used   Substance and Sexual Activity    Alcohol use: No    Drug use: No    Sexual activity: Not on file Lifestyle    Physical activity     Days per week: Not on file     Minutes per session: Not on file    Stress: Not on file   Relationships    Social connections     Talks on phone: Not on file     Gets together: Not on file     Attends Hoahaoism service: Not on file     Active member of club or organization: Not on file     Attends meetings of clubs or organizations: Not on file     Relationship status: Not on file    Intimate partner violence     Fear of current or ex partner: Not on file     Emotionally abused: Not on file     Physically abused: Not on file     Forced sexual activity: Not on file   Other Topics Concern    Not on file   Social History Narrative    Not on file       Allergies   Allergen Reactions    Penicillins      Pt is unaware what reaction         Current Outpatient Medications:     famotidine (PEPCID) 20 mg tablet, Take 1 tablet (20 mg total) by mouth 2 (two) times a day before meals (Patient taking differently: Take 20 mg by mouth as needed ), Disp: 30 tablet, Rfl: 0    Objective:      /78 (BP Location: Left arm, Patient Position: Sitting, Cuff Size: Adult)   Pulse 74   Temp (!) 97 1 °F (36 2 °C) (Tympanic)   Resp 17   Ht 5' 7" (1 702 m)   Wt 99 8 kg (220 lb)   BMI 34 46 kg/m²          Physical Exam  Constitutional:       General: She is not in acute distress  Appearance: Normal appearance  She is obese  She is not ill-appearing, toxic-appearing or diaphoretic  HENT:      Head: Normocephalic and atraumatic  Right Ear: External ear normal       Left Ear: External ear normal       Nose: Nose normal       Mouth/Throat:      Mouth: Mucous membranes are moist       Pharynx: Oropharynx is clear  Eyes:      General: No scleral icterus  Extraocular Movements: Extraocular movements intact  Conjunctiva/sclera: Conjunctivae normal    Neck:      Musculoskeletal: Normal range of motion and neck supple     Cardiovascular:      Rate and Rhythm: Normal rate and regular rhythm  Heart sounds: Normal heart sounds  Pulmonary:      Effort: Pulmonary effort is normal  No respiratory distress  Breath sounds: Normal breath sounds  Abdominal:      General: Abdomen is flat  Palpations: Abdomen is soft  Tenderness: There is no abdominal tenderness  Musculoskeletal: Normal range of motion  Skin:     General: Skin is warm and dry  Comments: Truncal varicosities to the anterior RLE  No wounds or skin changes    Neurological:      General: No focal deficit present  Mental Status: She is alert and oriented to person, place, and time  Mental status is at baseline  Cranial Nerves: No cranial nerve deficit  Sensory: No sensory deficit  Motor: No weakness     Psychiatric:         Mood and Affect: Mood normal          Behavior: Behavior normal

## 2021-05-06 NOTE — PATIENT INSTRUCTIONS
Varicose Veins   AMBULATORY CARE:   Varicose veins  are veins that become large, twisted, and swollen  They are common on the back of the calves, knees, and thighs  Varicose veins are caused by valves in your veins that do not work properly  This causes blood to collect and increase pressure in the veins of your legs  The increased pressure causes your veins to stretch, get larger, swell, and twist   Common symptoms include the following: Your symptoms may be worse after you stand or sit for long periods of time  You may have any of the following:  · Blue, purple, or bulging veins in your legs     · Pain, swelling, or muscle cramps in your legs    · Feeling of fatigue or heaviness in your legs    · Cramping in your legs    Seek care immediately if:   · You have a wound that does not heal or is infected  · You have an injury that has broken your skin and caused your varicose veins to bleed  · Your leg is swollen and hard  · You notice that your legs or feet are turning blue or black  · Your leg feels warm, tender, and painful  It may look swollen and red  Contact your healthcare provider if:   · You have pain in your leg that does not go away or gets worse  · You notice sudden large bruising on your legs  · You have a rash on your leg  · Your symptoms keep you from doing your daily activities  · You have questions or concerns about your condition or care  Treatment of varicose veins  aims to decrease symptoms, improve appearance, and prevent further problems  Treatment will depend on which veins are affected and how severe your condition is  You may need procedures to treat or remove your varicose veins  For example, your healthcare provider may inject a solution or use a laser to close the varicose veins  Surgery to remove long veins may also be done  Ask your healthcare provider for more information about procedures used to treat varicose veins    Manage varicose veins:   · Do not sit or stand for long periods of time  This can cause the blood to collect in your legs and make your symptoms worse  Bend or rotate your ankles several times every hour  Walk around for a few minutes every hour to get blood moving in your legs  · Do not cross your legs when you sit  This decreases blood flow to your feet and can make your symptoms worse  · Do not wear tight clothing or shoes  Do not wear high-heeled shoes  Do not wear clothes that are tight around the waist or knees  · Maintain a healthy weight  Being overweight or obese can make your varicose veins worse  Ask your healthcare provider how much you should weigh  Ask him or her to help you create a weight loss plan if you are overweight  · Wear pressure stockings as directed  The stockings are tight and put pressure on your legs  They improve blood flow and help prevent clots  · Elevate your legs  Keep them above the level of your heart for 15 to 30 minutes several times a day  You can also prop the end of your bed up slightly to elevate your legs while you sleep  This will help blood to flow back to your heart  · Get regular exercise  Talk to your healthcare provider about the best exercise plan for you  Exercise can improve blood flow to your legs and feet  Follow up with your healthcare provider as directed:  Write down your questions so you remember to ask them during your visits  © Copyright [address] Information is for End User's use only and may not be sold, redistributed or otherwise used for commercial purposes  All illustrations and images included in CareNotes® are the copyrighted property of [employer]  or [address] [place] [address] Bernice   The above information is an  only  It is not intended as medical advice for individual conditions or treatments  Talk to your doctor, nurse or pharmacist before following any medical regimen to see if it is safe and effective for you

## 2021-05-07 NOTE — ASSESSMENT & PLAN NOTE
44year old female with no significant PMHx presenting with symptomatic varicose veins of the RLE  -Symptoms include pain, heaviness and irritation   -Truncal varicosities noted on the anterior right shin  -No wounds or skin changes noted   -Hx of poor nail growth and thick nail beds of the RLE  Palpable pedal pulses with no sign of arterial insuffiencey  Follow up with podiatry  -Discussed pathophysiology and treatment of venous insuffiencey and varicose veins   -Recommend conservative management trial for 3mo including compression stockings, lower extremity elevation, weight loss and exercise  -Will order reflux study for assessment with f/u with surgeon  -Patient in agreement with current plan

## 2021-07-28 ENCOUNTER — TELEPHONE (OUTPATIENT)
Dept: GASTROENTEROLOGY | Facility: CLINIC | Age: 51
End: 2021-07-28

## 2021-07-28 ENCOUNTER — TELEPHONE (OUTPATIENT)
Dept: FAMILY MEDICINE CLINIC | Facility: CLINIC | Age: 51
End: 2021-07-28

## 2021-07-28 NOTE — TELEPHONE ENCOUNTER
07/28/21  Screened by: Alpa Chacon    Referring Provider   Pre- Screening: There is no height or weight on file to calculate BMI  Has patient been referred for a routine screening Colonoscopy? no -recall  Is the patient between 39-70 years old? yes      Previous Colonoscopy yes   If yes:    Date: 03/04/2016    Facility: [facility]    Reason: fam hx colon cancer      SCHEDULING STAFF: If the patient is between 39yrs-47yrs, please advise patient to confirm benefits/coverage with their insurance company for a routine screening colonoscopy, some insurance carriers will only cover at [facility] or [facility]  If the patient is over 66years old, please schedule an office visit  Does the patient want to see a Gastroenterologist prior to their procedure OR are they having any GI symptoms? no    Has the patient been hospitalized or had abdominal surgery in the past 6 months? no    Does the patient use supplemental oxygen? no    Does the patient take Coumadin, Lovenox, Plavix, Elliquis, Xarelto, or other blood thinning medication? no    Has the patient had a stroke, cardiac event, or stent placed in the past year? no    SCHEDULING STAFF: If patient answers NO to above questions, then schedule procedure  If patient answers YES to above questions, then schedule office appointment  If patient is between 45yrs - 49yrs, please advise patient that we will have to confirm benefits & coverage with their insurance company for a routine screening colonoscopy

## 2021-08-05 ENCOUNTER — TELEPHONE (OUTPATIENT)
Dept: GASTROENTEROLOGY | Facility: CLINIC | Age: 51
End: 2021-08-05

## 2021-08-05 VITALS — WEIGHT: 205 LBS | BODY MASS INDEX: 32.18 KG/M2 | HEIGHT: 67 IN

## 2021-08-05 DIAGNOSIS — Z80.0 FAMILY HX OF COLON CANCER: Primary | ICD-10-CM

## 2021-08-05 NOTE — TELEPHONE ENCOUNTER
Rx Clenpiq sent to provider for signature  Instructions emailed to patient        Sample left for patient

## 2021-08-09 RX ORDER — SODIUM PICOSULFATE, MAGNESIUM OXIDE, AND ANHYDROUS CITRIC ACID 10; 3.5; 12 MG/160ML; G/160ML; G/160ML
LIQUID ORAL
Qty: 320 ML | Refills: 0 | Status: SHIPPED | COMMUNITY
Start: 2021-08-09 | End: 2021-08-13 | Stop reason: HOSPADM

## 2021-08-11 ENCOUNTER — TELEPHONE (OUTPATIENT)
Dept: ADMINISTRATIVE | Facility: HOSPITAL | Age: 51
End: 2021-08-11

## 2021-08-13 ENCOUNTER — HOSPITAL ENCOUNTER (OUTPATIENT)
Dept: GASTROENTEROLOGY | Facility: AMBULATORY SURGERY CENTER | Age: 51
Discharge: HOME | End: 2021-08-13
Payer: COMMERCIAL

## 2021-08-13 ENCOUNTER — ANESTHESIA (OUTPATIENT)
Dept: GASTROENTEROLOGY | Facility: AMBULATORY SURGERY CENTER | Age: 51
End: 2021-08-13

## 2021-08-13 ENCOUNTER — ANESTHESIA EVENT (OUTPATIENT)
Dept: GASTROENTEROLOGY | Facility: AMBULATORY SURGERY CENTER | Age: 51
End: 2021-08-13

## 2021-08-13 VITALS
OXYGEN SATURATION: 100 % | TEMPERATURE: 98 F | HEART RATE: 54 BPM | DIASTOLIC BLOOD PRESSURE: 88 MMHG | RESPIRATION RATE: 14 BRPM | SYSTOLIC BLOOD PRESSURE: 133 MMHG

## 2021-08-13 DIAGNOSIS — Z80.0 FAMILY HISTORY OF COLON CANCER: ICD-10-CM

## 2021-08-13 DIAGNOSIS — Z12.11 SCREENING FOR COLON CANCER: ICD-10-CM

## 2021-08-13 PROCEDURE — G0105 COLORECTAL SCRN; HI RISK IND: HCPCS | Performed by: INTERNAL MEDICINE

## 2021-08-13 RX ORDER — SODIUM CHLORIDE, SODIUM LACTATE, POTASSIUM CHLORIDE, CALCIUM CHLORIDE 600; 310; 30; 20 MG/100ML; MG/100ML; MG/100ML; MG/100ML
50 INJECTION, SOLUTION INTRAVENOUS CONTINUOUS
Status: DISCONTINUED | OUTPATIENT
Start: 2021-08-13 | End: 2021-08-17 | Stop reason: HOSPADM

## 2021-08-13 RX ORDER — PROPOFOL 10 MG/ML
INJECTION, EMULSION INTRAVENOUS AS NEEDED
Status: DISCONTINUED | OUTPATIENT
Start: 2021-08-13 | End: 2021-08-13

## 2021-08-13 RX ADMIN — PROPOFOL 100 MG: 10 INJECTION, EMULSION INTRAVENOUS at 09:45

## 2021-08-13 RX ADMIN — SODIUM CHLORIDE, SODIUM LACTATE, POTASSIUM CHLORIDE, CALCIUM CHLORIDE: 600; 310; 30; 20 INJECTION, SOLUTION INTRAVENOUS at 10:04

## 2021-08-13 RX ADMIN — PROPOFOL 50 MG: 10 INJECTION, EMULSION INTRAVENOUS at 09:54

## 2021-08-13 RX ADMIN — PROPOFOL 50 MG: 10 INJECTION, EMULSION INTRAVENOUS at 09:47

## 2021-08-13 RX ADMIN — PROPOFOL 50 MG: 10 INJECTION, EMULSION INTRAVENOUS at 09:56

## 2021-08-13 RX ADMIN — SODIUM CHLORIDE, SODIUM LACTATE, POTASSIUM CHLORIDE, CALCIUM CHLORIDE 50 ML/HR: 600; 310; 30; 20 INJECTION, SOLUTION INTRAVENOUS at 09:26

## 2021-08-13 RX ADMIN — PROPOFOL 50 MG: 10 INJECTION, EMULSION INTRAVENOUS at 09:50

## 2021-08-13 NOTE — ANESTHESIA PREPROCEDURE EVALUATION
Procedure:  COLONOSCOPY    Relevant Problems   ANESTHESIA  no issues with prior endoscopy MAC; had back pain/"unable to use muscles" after a remote D&E, unknown anesthetic      CARDIO (within normal limits)      PULMONARY (within normal limits)   (-) Sleep apnea   (-) Smoking   (-) URI (upper respiratory infection)    BMI 32    Physical Exam    Airway    Mallampati score: II  TM Distance: >3 FB  Neck ROM: full     Dental   No notable dental hx     Cardiovascular      Pulmonary      Other Findings        Anesthesia Plan  ASA Score- 2     Anesthesia Type- IV sedation with anesthesia with ASA Monitors  Additional Monitors:   Airway Plan:           Plan Factors-Exercise tolerance (METS): >4 METS  Chart reviewed  Existing labs reviewed  Patient summary reviewed  Patient is not a current smoker  Induction- intravenous  Postoperative Plan-     Informed Consent- Anesthetic plan and risks discussed with patient  I personally reviewed this patient with the CRNA  Discussed and agreed on the Anesthesia Plan with the CRNA  Pee Bradford

## 2021-08-13 NOTE — DISCHARGE INSTRUCTIONS
Colonoscopy   WHAT YOU NEED TO KNOW:   A colonoscopy is a procedure to examine the inside of your colon (intestine) with a scope  Polyps or tissue growths may have been removed during your colonoscopy  It is normal to feel bloated and to have some abdominal discomfort  You should be passing gas  If you have hemorrhoids or you had polyps removed, you may have a small amount of bleeding  DISCHARGE INSTRUCTIONS:   Seek care immediately if:    You have sudden, severe abdominal pain   You have problems swallowing   You have a large amount of black, sticky bowel movements or blood in your bowel movements   You have sudden trouble breathing   You feel weak, lightheaded, or faint or your heart beats faster than normal for you  Contact your healthcare provider if:    You have a fever and chills   You have nausea or are vomiting   Your abdomen is bloated or feels full and hard   You have abdominal pain   You have black, sticky bowel movements or blood in your bowel movements   You have not had a bowel movement for 3 days after your procedure   You have rash or hives   You have questions or concerns about your procedure  Activity:    Do not lift, strain, or run for 24 hours after your procedure   Rest after your procedure  You have been given medicine to relax you  Do not drive or make important decisions until the day after your procedure  Return to your normal activity as directed   Relieve gas and discomfort from bloating by lying on your right side with a heating pad on your abdomen  You may need to take short walks to help the gas move out  Eat small meals until bloating is relieved  Follow up with your healthcare provider as directed: Write down your questions so you remember to ask them during your visits  If you take a blood thinner, please review the specific instructions from your endoscopist about when you should resume it   These can be found in the Recommendation and Your Medication list sections of this After Visit Summary  Hemorrhoids   WHAT YOU NEED TO KNOW:   What are hemorrhoids? Hemorrhoids are swollen blood vessels inside your rectum (internal hemorrhoids) or on your anus (external hemorrhoids)  Sometimes a hemorrhoid may prolapse  This means it extends out of your anus  What increases my risk for hemorrhoids? · Pregnancy or obesity    · Straining or sitting for a long time during bowel movements    · Liver disease    · Weak muscles around the anus caused by older age, rectal surgery, or anal intercourse    · A lack of physical activity    · Chronic diarrhea or constipation    · A low-fiber diet    What are the signs and symptoms of hemorrhoids? · Pain or itching around your anus or inside your rectum    · Swelling or bumps around your anus    · Bright red blood in your bowel movement, on the toilet paper, or in the toilet bowl    · Tissue bulging out of your anus (prolapsed hemorrhoids)    · Incontinence (poor control over urine or bowel movements)    How are hemorrhoids diagnosed? Your healthcare provider will ask about your symptoms, the foods you eat, and your bowel movements  He or she will examine your anus for external hemorrhoids  You may need the following:  · A digital rectal exam  is a test to check for hemorrhoids  Your healthcare provider will put a gloved finger inside your anus to feel for the hemorrhoids  · An anoscopy  is a test that uses a scope (small tube with a light and camera on the end) to look at your hemorrhoids  How are hemorrhoids treated? Treatment will depend on your symptoms  You may need any of the following:  · Medicines  can help decrease pain and swelling, and soften your bowel movement  The medicine may be a pill, pad, cream, or ointment  · Procedures  may be used to shrink or remove your hemorrhoid  Examples include rubber-band ligation, sclerotherapy, and photocoagulation  These procedures may be done in your healthcare provider's office  Ask your healthcare provider for more information about these procedures  · Surgery  may be needed to shrink or remove your hemorrhoids  How can I manage my symptoms? · Apply ice on your anus for 15 to 20 minutes every hour or as directed  Use an ice pack, or put crushed ice in a plastic bag  Cover it with a towel before you apply it to your anus  Ice helps prevent tissue damage and decreases swelling and pain  · Take a sitz bath  Fill a bathtub with 4 to 6 inches of warm water  You may also use a sitz bath pan that fits inside a toilet bowl  Sit in the sitz bath for 15 minutes  Do this 3 times a day, and after each bowel movement  The warm water can help decrease pain and swelling  · Keep your anal area clean  Gently wash the area with warm water daily  Soap may irritate the area  After a bowel movement, wipe with moist towelettes or wet toilet paper  Dry toilet paper can irritate the area  How can I help prevent hemorrhoids? · Do not strain to have a bowel movement  Do not sit on the toilet too long  These actions can increase pressure on the tissues in your rectum and anus  · Drink plenty of liquids  Liquids can help prevent constipation  Ask how much liquid to drink each day and which liquids are best for you  · Eat a variety of high-fiber foods  Examples include fruits, vegetables, and whole grains  Ask your healthcare provider how much fiber you need each day  You may need to take a fiber supplement  · Exercise as directed  Exercise, such as walking, may make it easier to have a bowel movement  Ask your healthcare provider to help you create an exercise plan  · Do not have anal sex  Anal sex can weaken the skin around your rectum and anus  · Avoid heavy lifting  This can cause straining and increase your risk for another hemorrhoid  When should I seek immediate care?    · You have severe pain in your rectum or around your anus  · You have severe pain in your abdomen and you are vomiting  · You have bleeding from your anus that soaks through your underwear  When should I contact my healthcare provider? · You have frequent and painful bowel movements  · Your hemorrhoid looks or feels more swollen than usual      · You do not have a bowel movement for 2 days or more  · You see or feel tissue coming through your anus  · You have questions or concerns about your condition or care  CARE AGREEMENT:   You have the right to help plan your care  Learn about your health condition and how it may be treated  Discuss treatment options with your healthcare providers to decide what care you want to receive  You always have the right to refuse treatment  The above information is an  only  It is not intended as medical advice for individual conditions or treatments  Talk to your doctor, nurse or pharmacist before following any medical regimen to see if it is safe and effective for you  © Copyright [employer] 2021 Information is for End User's use only and may not be sold, redistributed or otherwise used for commercial purposes  All illustrations and images included in CareNotes® are the copyrighted property of [employer]  or [facility]   Diverticulosis   WHAT YOU NEED TO KNOW:   What is diverticulosis? Diverticulosis is a condition that causes small pockets called diverticula to form in your intestine  These pockets make it difficult for bowel movements to pass through your digestive system  What causes diverticulosis? Diverticula form when muscles have to work hard to move bowel movements through the intestine  The force causes bulges to form at weak areas in the intestine  This may happen if you eat foods that are low in fiber  Fiber helps give your bowel movements more bulk so they are larger and easier to move through your colon   The following may increase your risk of diverticulosis:  · A history of constipation    · Age 36 or older    · Obesity    · Lack of exercise    What are the signs and symptoms of diverticulosis? Diverticulosis usually does not cause any signs or symptoms  It may cause any of the following in some people:  · Pain or discomfort in your lower abdomen    · Abdominal bloating    · Constipation or diarrhea    How is diverticulosis diagnosed? Your healthcare provider will examine you and ask about your bowel movements, diet, and symptoms  He or she will also ask about any medical conditions you have or medicines you take  You may need any of the following:  · Blood tests  may be done to check for signs of inflammation  · A barium enema  is an x-ray of your colon that may show diverticula  A tube is put into your anus, and a liquid called barium is put through the tube  Barium is used so that healthcare providers can see your colon more clearly  · Flexible sigmoidoscopy  is a test to look for any changes in your lower intestines and rectum  It may also show the cause of any bleeding or pain  A soft, bendable tube with a light on the end will be put into your anus  It will then be moved forward into your intestine  · A colonoscopy  is used to look at your whole colon  A scope (long bendable tube with a light on the end) is used to take pictures  This test may show diverticula  · A CT scan , or CAT scan, may show diverticula  You may be given contrast liquid before the scan  Tell the healthcare provider if you have ever had an allergic reaction to contrast liquid  How is diverticulosis managed? The goal of treatment is to manage any symptoms you have and prevent other problems such as diverticulitis  Diverticulitis is swelling or infection of the diverticula  Your healthcare provider may recommend any of the following:  · Eat a variety of high-fiber foods  High-fiber foods help you have regular bowel movements  High-fiber foods include cooked beans, fruits, vegetables, and some cereals  Most adults need 25 to 35 grams of fiber each day  Your healthcare provider may recommend that you have more  Ask your healthcare provider how much fiber you need  Increase fiber slowly  You may have abdominal discomfort, bloating, and gas if you add fiber to your diet too quickly  You may need to take a fiber supplement if you are not getting enough fiber from food  · Medicines  to soften your bowel movements may be given  You may also need medicines to treat symptoms such as bloating and pain  · Drink liquids as directed  You may need to drink 2 to 3 liters (8 to 12 cups) of liquids every day  Ask your healthcare provider how much liquid to drink each day and which liquids are best for you  · Apply heat  on your abdomen for 20 to 30 minutes every 2 hours for as many days as directed  Heat helps decrease pain and muscle spasms  How can I help prevent diverticulitis or other symptoms? The following may help decrease your risk for diverticulitis or symptoms, such as bleeding  Talk to your provider about these or other things you can do to prevent problems that may occur with diverticulosis  · Exercise regularly  Ask your healthcare provider about the best exercise plan for you  Exercise can help you have regular bowel movements  Get 30 minutes of exercise on most days of the week  · Maintain a healthy weight  Ask your healthcare provider how much you should weigh  Ask him or her to help you create a weight loss plan if you are overweight  · Do not smoke  Nicotine and other chemicals in cigarettes increase your risk for diverticulitis  Ask your healthcare provider for information if you currently smoke and need help to quit  E-cigarettes or smokeless tobacco still contain nicotine  Talk to your healthcare provider before you use these products  · Ask your healthcare provider if it is safe to take NSAIDs  NSAIDs may increase your risk of diverticulitis  When should I seek immediate care? · You have severe pain on the left side of your lower abdomen  · Your bowel movements are bright or dark red  When should I contact my healthcare provider? · You have a fever and chills  · You feel dizzy or lightheaded  · You have nausea, or you are vomiting  · You have a change in your bowel movements  · You have questions or concerns about your condition or care  CARE AGREEMENT:   You have the right to help plan your care  Learn about your health condition and how it may be treated  Discuss treatment options with your healthcare providers to decide what care you want to receive  You always have the right to refuse treatment  The above information is an  only  It is not intended as medical advice for individual conditions or treatments  Talk to your doctor, nurse or pharmacist before following any medical regimen to see if it is safe and effective for you  © Copyright [employer] 2021 Information is for End User's use only and may not be sold, redistributed or otherwise used for commercial purposes   All illustrations and images included in CareNotes® are the copyrighted property of A D A M , Inc  or [address] [place] [address]

## 2021-08-13 NOTE — ANESTHESIA POSTPROCEDURE EVALUATION
Post-Op Assessment Note    CV Status:  Stable  Pain Score: 0    Pain management: adequate     Mental Status:  Sleepy   Hydration Status:  Euvolemic and stable   PONV Controlled:  None   Airway Patency:  Patent      Post Op Vitals Reviewed: Yes      Staff: CRNA         No complications documented      /87 (08/13/21 1005)    Temp      Pulse 58 (08/13/21 1005)   Resp 13 (08/13/21 1005)    SpO2 100 % (08/13/21 1005)

## 2021-08-23 ENCOUNTER — CLINICAL SUPPORT (OUTPATIENT)
Dept: FAMILY MEDICINE CLINIC | Facility: CLINIC | Age: 51
End: 2021-08-23
Payer: COMMERCIAL

## 2021-08-23 DIAGNOSIS — Z23 NEED FOR HEPATITIS B BOOSTER VACCINATION: Primary | ICD-10-CM

## 2021-08-23 PROCEDURE — 90746 HEPB VACCINE 3 DOSE ADULT IM: CPT

## 2021-08-23 PROCEDURE — 90471 IMMUNIZATION ADMIN: CPT

## 2021-10-22 ENCOUNTER — OFFICE VISIT (OUTPATIENT)
Dept: FAMILY MEDICINE CLINIC | Facility: CLINIC | Age: 51
End: 2021-10-22
Payer: COMMERCIAL

## 2021-10-22 VITALS
RESPIRATION RATE: 16 BRPM | WEIGHT: 213.8 LBS | BODY MASS INDEX: 33.56 KG/M2 | HEIGHT: 67 IN | HEART RATE: 71 BPM | SYSTOLIC BLOOD PRESSURE: 122 MMHG | DIASTOLIC BLOOD PRESSURE: 78 MMHG

## 2021-10-22 DIAGNOSIS — H35.342 MACULAR HOLE OF LEFT EYE: ICD-10-CM

## 2021-10-22 DIAGNOSIS — Z01.818 PRE-OP EXAMINATION: Primary | ICD-10-CM

## 2021-10-22 DIAGNOSIS — Z12.31 ENCOUNTER FOR SCREENING MAMMOGRAM FOR MALIGNANT NEOPLASM OF BREAST: ICD-10-CM

## 2021-10-22 DIAGNOSIS — Z11.9 ENCOUNTER FOR SCREENING FOR INFECTIOUS AND PARASITIC DISEASES, UNSPECIFIED: ICD-10-CM

## 2021-10-22 PROCEDURE — 3725F SCREEN DEPRESSION PERFORMED: CPT | Performed by: PHYSICIAN ASSISTANT

## 2021-10-22 PROCEDURE — 1036F TOBACCO NON-USER: CPT | Performed by: PHYSICIAN ASSISTANT

## 2021-10-22 PROCEDURE — 99214 OFFICE O/P EST MOD 30 MIN: CPT | Performed by: PHYSICIAN ASSISTANT

## 2021-10-22 PROCEDURE — 3008F BODY MASS INDEX DOCD: CPT | Performed by: PHYSICIAN ASSISTANT

## 2021-10-22 RX ORDER — DIPHENOXYLATE HYDROCHLORIDE AND ATROPINE SULFATE 2.5; .025 MG/1; MG/1
1 TABLET ORAL DAILY
COMMUNITY

## 2021-10-27 ENCOUNTER — HOSPITAL ENCOUNTER (OUTPATIENT)
Dept: MAMMOGRAPHY | Facility: CLINIC | Age: 51
Discharge: HOME | End: 2021-10-27
Payer: COMMERCIAL

## 2021-10-27 VITALS — BODY MASS INDEX: 33.43 KG/M2 | HEIGHT: 67 IN | WEIGHT: 213 LBS

## 2021-10-27 DIAGNOSIS — Z12.31 ENCOUNTER FOR SCREENING MAMMOGRAM FOR MALIGNANT NEOPLASM OF BREAST: ICD-10-CM

## 2021-10-27 PROCEDURE — 77067 SCR MAMMO BI INCL CAD: CPT

## 2021-10-27 PROCEDURE — 77063 BREAST TOMOSYNTHESIS BI: CPT

## 2021-11-01 ENCOUNTER — CLINICAL SUPPORT (OUTPATIENT)
Dept: FAMILY MEDICINE CLINIC | Facility: CLINIC | Age: 51
End: 2021-11-01

## 2021-11-01 DIAGNOSIS — Z01.818 PRE-OP EXAMINATION: Primary | ICD-10-CM

## 2021-11-01 PROCEDURE — U0005 INFEC AGEN DETEC AMPLI PROBE: HCPCS | Performed by: PHYSICIAN ASSISTANT

## 2021-11-01 PROCEDURE — U0003 INFECTIOUS AGENT DETECTION BY NUCLEIC ACID (DNA OR RNA); SEVERE ACUTE RESPIRATORY SYNDROME CORONAVIRUS 2 (SARS-COV-2) (CORONAVIRUS DISEASE [COVID-19]), AMPLIFIED PROBE TECHNIQUE, MAKING USE OF HIGH THROUGHPUT TECHNOLOGIES AS DESCRIBED BY CMS-2020-01-R: HCPCS | Performed by: PHYSICIAN ASSISTANT

## 2021-11-02 LAB — SARS-COV-2 RNA RESP QL NAA+PROBE: NEGATIVE

## 2021-11-03 ENCOUNTER — TELEPHONE (OUTPATIENT)
Dept: FAMILY MEDICINE CLINIC | Facility: CLINIC | Age: 51
End: 2021-11-03

## 2022-04-27 ENCOUNTER — APPOINTMENT (OUTPATIENT)
Dept: RADIOLOGY | Facility: CLINIC | Age: 52
End: 2022-04-27
Payer: COMMERCIAL

## 2022-04-27 ENCOUNTER — OFFICE VISIT (OUTPATIENT)
Dept: FAMILY MEDICINE CLINIC | Facility: CLINIC | Age: 52
End: 2022-04-27
Payer: COMMERCIAL

## 2022-04-27 VITALS
RESPIRATION RATE: 15 BRPM | WEIGHT: 217.3 LBS | OXYGEN SATURATION: 97 % | DIASTOLIC BLOOD PRESSURE: 82 MMHG | HEART RATE: 80 BPM | HEIGHT: 67 IN | BODY MASS INDEX: 34.11 KG/M2 | SYSTOLIC BLOOD PRESSURE: 124 MMHG

## 2022-04-27 DIAGNOSIS — M25.461 PAIN AND SWELLING OF RIGHT KNEE: Primary | ICD-10-CM

## 2022-04-27 DIAGNOSIS — M25.461 PAIN AND SWELLING OF RIGHT KNEE: ICD-10-CM

## 2022-04-27 DIAGNOSIS — M25.561 PAIN AND SWELLING OF RIGHT KNEE: Primary | ICD-10-CM

## 2022-04-27 DIAGNOSIS — M25.561 PAIN AND SWELLING OF RIGHT KNEE: ICD-10-CM

## 2022-04-27 DIAGNOSIS — H26.9 CATARACT OF LEFT EYE, UNSPECIFIED CATARACT TYPE: ICD-10-CM

## 2022-04-27 PROCEDURE — 1036F TOBACCO NON-USER: CPT | Performed by: NURSE PRACTITIONER

## 2022-04-27 PROCEDURE — 99214 OFFICE O/P EST MOD 30 MIN: CPT | Performed by: NURSE PRACTITIONER

## 2022-04-27 PROCEDURE — 3725F SCREEN DEPRESSION PERFORMED: CPT | Performed by: NURSE PRACTITIONER

## 2022-04-27 PROCEDURE — 73562 X-RAY EXAM OF KNEE 3: CPT

## 2022-04-27 PROCEDURE — 3008F BODY MASS INDEX DOCD: CPT | Performed by: NURSE PRACTITIONER

## 2022-04-27 NOTE — PATIENT INSTRUCTIONS
Mediterranean Diet   AMBULATORY CARE:   A Mediterranean diet  is a meal plan that includes foods that are commonly eaten in countries that border the [place]  This meal plan may provide several health benefits  These include losing or maintaining weight, and decreasing blood pressure, blood sugar, and cholesterol levels  It may also help protect against certain health conditions such as heart disease, cancer, type 2 diabetes, and Alzheimer disease  Work with a dietitian to develop a meal plan that is right for you  Foods to include in the [address] [facility] diet:   · Include fruits and vegetables in each meal   Eat a variety of fresh fruits and vegetables  · Choose whole grains every day  These foods include whole-grain breads, pastas, and cereals  It also includes brown rice, quinoa, and millet  · Use unsaturated fats instead of saturated fats  Cook with olive or canola oil  Limit saturated fats, such as butter, margarine, and shortening  Saturated fat is an unhealthy fat that can increase your cholesterol levels  · Choose plant foods, poultry, and fish as your main sources of protein  ? Eat plant-based foods that provide protein,  such as lentils, beans, chickpeas, nuts, and seeds  Choose mostly plant-based foods in place of meat on most days of the week  ? Eat protein foods high in omega-3 fats  Fish high in omega-3 fats include salmon, trout, and tuna  Include these types of fish 1 or 2 times each week  Limit fish high in mercury, such as shark, swordfish, tilefish, and jerome mackerel  Omega-3 fats are also found in walnuts and flaxseed  ? Choose poultry (chicken or turkey)  without skin instead of red meat  Red meat is high in saturated fat  Limit eggs and high-fat meats, such as dooley, sausage, and hot dogs  · Choose low-fat dairy foods  such as nonfat or 1% milk, or low-fat almond, cashew, or soy milk   Other examples include low-fat cheese, yogurt, and cottage cheese  · Limit sweets  Limit your intake of high-sugar foods, such as soda, desserts, and candy  · Talk to your healthcare provider about alcohol  Studies have shown that moderate intake of wine may reduce the risk of heart disease  A moderate amount of wine is 1 serving for women and men 65 years and older each day  Two servings is recommended for men 24to 59years of age each day  A serving of wine is 5 ounces  Other things you need to know if you follow the Mediterranean diet:   · Include foods high in iron and vitamin C   Plant-based foods that are high in iron include spinach, beans, tofu, and artichoke  Eat a serving of vitamin C with any iron-rich food to help your body absorb more iron  Examples include oranges, strawberries, cantaloupe, broccoli, and yellow peppers  · Get regular physical activity  The Mediterranean diet will have the most benefit if you get regular physical activity  Get 30 minutes of physical activity at least 5 days a week  Choose physical activities that increase your heart rate  Examples include walking, hiking, swimming, and riding a bike  Ask your healthcare provider about the best exercise plan for you  © Copyright [employer] 2022 Information is for End User's use only and may not be sold, redistributed or otherwise used for commercial purposes  All illustrations and images included in CareNotes® are the copyrighted property of [employer]  or [employer]  The above information is an  only  It is not intended as medical advice for individual conditions or treatments  Talk to your doctor, nurse or pharmacist before following any medical regimen to see if it is safe and effective for you

## 2022-04-27 NOTE — PROGRESS NOTES
Assessment/Plan:     Diagnoses and all orders for this visit:    Pain and swelling of right knee  -     Ambulatory Referral to Physical Therapy; Future  -     XR knee 3 vw right non injury; Future    PT referral & xray ordered  She may utilize Tylenol & ice PRN  Patient is encouraged to call our office for any questions/concerns, persistent or worsening symptoms  Patient states they understand and agree with treatment plan  Cataract of left eye, unspecified cataract type  -     Ambulatory Referral to Ophthalmology; Future      Referral placed per pt request     Subjective:      Patient ID: Abigail Hess is a 46 y o  female  Patient presents for several months of right knee pain and swelling  She denies injury or certain activity that started the pain  She has tried a knee brace and compression sleeves without much relief  She has also tried Tylenol and ibuprofen without relief  Patient admits to pain with acsending & descending stairs  She also notes the pain is worse with standing or physical activity and feels better with rest   She denies clicks or crunches  She occasionally may hear a pop but denies any associated pain  She also notes difficulty in flexing the knee or squatting and is now having pain to her posterior thigh and lower leg  Patient denies redness to knee  The following portions of the patient's history were reviewed and updated as appropriate: allergies, current medications, past family history, past medical history, past social history, past surgical history and problem list     Review of Systems   Constitutional: Negative  Negative for chills and fatigue  HENT: Negative  Respiratory: Negative  Negative for cough and shortness of breath  Cardiovascular: Negative  Negative for chest pain  Gastrointestinal: Negative  Genitourinary: Negative  Musculoskeletal: Positive for arthralgias (right knee pain x several months)  Negative for myalgias     Neurological: Negative  Objective:      /82   Pulse 80   Resp 15   Ht 5' 7" (1 702 m)   Wt 98 6 kg (217 lb 4 8 oz)   SpO2 97%   BMI 34 03 kg/m²          Physical Exam  Constitutional:       General: She is not in acute distress  Appearance: Normal appearance  She is not ill-appearing  Pulmonary:      Effort: Pulmonary effort is normal    Musculoskeletal:      Right knee: No swelling, effusion, erythema, bony tenderness or crepitus  Decreased range of motion (decrease in flexion, normal extension, normal internal & external rotation)  No tenderness  Normal alignment, normal meniscus and normal patellar mobility  Instability Tests: Anterior drawer test negative  Posterior drawer test negative  Left knee: Normal    Neurological:      Mental Status: She is alert and oriented to person, place, and time  Mental status is at baseline  Psychiatric:         Mood and Affect: Mood normal          Behavior: Behavior normal          Thought Content:  Thought content normal          Judgment: Judgment normal

## 2022-05-03 ENCOUNTER — EVALUATION (OUTPATIENT)
Dept: PHYSICAL THERAPY | Facility: CLINIC | Age: 52
End: 2022-05-03
Payer: COMMERCIAL

## 2022-05-03 DIAGNOSIS — M25.561 PAIN AND SWELLING OF RIGHT KNEE: ICD-10-CM

## 2022-05-03 DIAGNOSIS — M25.461 PAIN AND SWELLING OF RIGHT KNEE: ICD-10-CM

## 2022-05-03 PROCEDURE — 97161 PT EVAL LOW COMPLEX 20 MIN: CPT | Performed by: PHYSICAL THERAPIST

## 2022-05-03 PROCEDURE — 97110 THERAPEUTIC EXERCISES: CPT | Performed by: PHYSICAL THERAPIST

## 2022-05-03 NOTE — PROGRESS NOTES
PT Evaluation     Today's date: 5/3/2022  Patient name: Blanca Leon  : 1970  MRN: [id number]  Referring provider: BESSIE Saldivar  Dx:   Encounter Diagnosis     ICD-10-CM    1  Pain and swelling of right knee  M25 561 Ambulatory Referral to Physical Therapy    M25 461                   Assessment  Assessment details: Patient is a 46 y o  female with PT prescription for R knee pain and swelling  Patient presents with decreased knee ROM, decreased R LE strength, decreased quad flexibility, decreased knee stability/control, and poor knee alignment/squat mechanics  These impairments limit patient with stair climbing, transferring, prolonged standing/walking, working on farm, and performing any kneeling/squatting activities  To address impairments and improve function, patient would benefit from skilled PT consisting of manual therapy to improve ROM and joint mobility, therapeutic exercises to improve LE strength and flexibility, neuromuscular reeducation to improve LE control, and patient education on home program     Impairments: abnormal or restricted ROM, abnormal movement, impaired balance, impaired physical strength, lacks appropriate home exercise program, pain with function, poor posture  and poor body mechanics    Symptom irritability: moderateUnderstanding of Dx/Px/POC: good   Prognosis: good    Goals  Short term goals:  Patient is independent in home program to support plan of care and improve function  - 2 weeks  Patient demonstrates normal LE flexibility so she can walk in home with less pain  - 2 weeks  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  - 2 weeks    Long term goals:  Patient demonstrates improvement in community participation with increase in FOTO score by 20%  - 8 weeks  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   - 6 weeks  Patient demonstrates symmetrical knee stability in single limb stance so she can walk on uneven ground without pain  - 8 weeks  Patient demonstrates 5/5 LE strength so she can descend stairs without pain  - 8 weeks      Plan  Patient would benefit from: skilled physical therapy  Planned modality interventions: cryotherapy  Planned therapy interventions: activity modification, ADL training, balance, body mechanics training, flexibility, functional ROM exercises, home exercise program, therapeutic exercise, therapeutic activities, strengthening, stretching, patient education, neuromuscular re-education, massage, manual therapy and joint mobilization  Frequency: 2x week  Duration in weeks: 8  Plan of Care beginning date: 5/3/2022  Plan of Care expiration date: 2022  Treatment plan discussed with: patient        Subjective Evaluation    History of Present Illness  Date of onset: 2022  Mechanism of injury: Patient has evaluation for R knee pain and swelling  Patient reports onset several months ago  No specific aggravating incident  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  She had gradual worsening of pain, with swelling noted 3 weeks ago  Symptoms: R knee pain, swelling (especially at end of day)  Pain increases with bending knee  Difficulty walking, stair climbing, getting up from toilet, woken almost every night  She gets occasional buckling, which is reduced with wearing brace, but brace increases pain  She is unable to kneel or squat  Some pain lower lateral leg    Can get posterior thigh and low back pain  Denies paraesthesias in LE       PMH: L IT band issues  R hip pain, intermittent LBP, right posterior thigh and lower leg pain      Pain  Current pain ratin  At best pain ratin  At worst pain ratin    Patient Goals  Patient goals for therapy: decreased pain and increased motion          Objective     Tenderness     Additional Tenderness Details  Mild tenderness to R lateral knee anterior and posterior, posterior along hamstring tendon    Active Range of Motion Left Knee   Hyperextension  Flexion: 142 degrees   Extension: 2 degrees     Right Knee   Flexion: 130 degrees with pain  Extension: 0 degrees with pain    Passive Range of Motion   Left Knee   Flexion: 145 degrees     Right Knee   Flexion: 134 degrees with pain    Additional Passive Range of Motion Details  Moderate quad tightness on R  Mild quad tightness on L  Hamstrings WNL    Strength/Myotome Testing     Left Hip   Planes of Motion   Flexion: 4+  Extension: 4+  Abduction: 5    Right Hip   Planes of Motion   Flexion: 4  Extension: 4+  Abduction: 4+  Adduction: 4+    Left Knee   Flexion: 5  Extension: 5    Right Knee   Flexion: 5 (pain)  Extension: 5    Tests     Left Knee   Negative Thessaly's test at 5 degrees  Right Knee   Positive Thessaly's test at 5 degrees  Negative valgus stress test at 0 degrees and varus stress test at 0 degrees       Additional Tests Details  SLS: 21 seconds on R EO, 30 L, 10 on R EC, 3 on L    Squat: anterior position of knees over toes, limited range due to pain/tightness    Swelling     Left Knee Girth Measurement (cm)   10 cm above joint line: 49 cm  10 cm below joint line: 44 5 cm    Right Knee Girth Measurement (cm)   10 cm above joint line: 53 cm  10 cm below joint line: 45 5 cm             Precautions: EPOC 7/8/22    Manuals 5/3            A-P mobs tib femoral jt gr 3-4 MIKEY                         STM quad                          Neuro Re-Ed                          SLS             steamboats                                       Ther Ex             Heel slides                          Pt edu on plan of care, pathology, home program 8'                         Hamstring stretch             Prone quad stretch 3x15"            SLR abd             SLR 10x            clamshells             bridges                                                    Ther Activity                                       Gait Training                                       Modalities

## 2022-05-05 ENCOUNTER — OFFICE VISIT (OUTPATIENT)
Dept: PHYSICAL THERAPY | Facility: CLINIC | Age: 52
End: 2022-05-05
Payer: COMMERCIAL

## 2022-05-05 DIAGNOSIS — M25.461 PAIN AND SWELLING OF RIGHT KNEE: Primary | ICD-10-CM

## 2022-05-05 DIAGNOSIS — M25.561 PAIN AND SWELLING OF RIGHT KNEE: Primary | ICD-10-CM

## 2022-05-05 PROCEDURE — 97140 MANUAL THERAPY 1/> REGIONS: CPT | Performed by: PHYSICAL THERAPIST

## 2022-05-05 PROCEDURE — 97110 THERAPEUTIC EXERCISES: CPT | Performed by: PHYSICAL THERAPIST

## 2022-05-05 NOTE — PROGRESS NOTES
Daily Note     Today's date: 2022  Patient name: Miles Hayden  : 1970  MRN: [id number]  Referring provider: BESSIE Suazo  Dx:   Encounter Diagnosis     ICD-10-CM    1  Pain and swelling of right knee  M25 561     M25 461                   Subjective: patient reports mostly tightness today, 4/10 pain  She had increased soreness following evaluation  Objective: See treatment diary below      Assessment: Began session on stationary bike to improve LE strength and ROM  She noted no provocation of symptoms  Patient had pain and difficulty with knee flexion/heel slides and bridges  She tolerated IASTM to quad and IT band well, noting 0/10 pain by end of session  Patient would benefit from continued skilled PT per plan of care  Plan: Continue per plan of care        Precautions: EPOC 22    Manuals 5/3 5           A-P mobs tib femoral jt gr 3-4 MIKEY            Knee ext MWM  MIKEY           STM quad  MIKEY           IASTM IT band  MIKEY           Neuro Re-Ed                          SLS             steamboats                                       Ther Ex             Heel slides  5x10"           bike  5' lvl 1           Pt edu on plan of care, pathology, home program 8' 5'                        IT band stretch  15"           Prone quad stretch 3x15" 3x15"           SLR abd  2x10           SLR 10x 10x           clamshells             bridges  10x           Adductor squeeze  15x                                     Ther Activity                                       Gait Training                                       Modalities

## 2022-05-10 ENCOUNTER — OFFICE VISIT (OUTPATIENT)
Dept: PHYSICAL THERAPY | Facility: CLINIC | Age: 52
End: 2022-05-10
Payer: COMMERCIAL

## 2022-05-10 DIAGNOSIS — M25.461 PAIN AND SWELLING OF RIGHT KNEE: Primary | ICD-10-CM

## 2022-05-10 DIAGNOSIS — M25.561 PAIN AND SWELLING OF RIGHT KNEE: Primary | ICD-10-CM

## 2022-05-10 PROCEDURE — 97140 MANUAL THERAPY 1/> REGIONS: CPT | Performed by: PHYSICAL THERAPIST

## 2022-05-10 PROCEDURE — 97110 THERAPEUTIC EXERCISES: CPT | Performed by: PHYSICAL THERAPIST

## 2022-05-10 NOTE — PROGRESS NOTES
Daily Note     Today's date: 5/10/2022  Patient name: Fer Celaya  : 1970  MRN: [id number]  Referring provider: BESSIE Persaud  Dx:   Encounter Diagnosis     ICD-10-CM    1  Pain and swelling of right knee  M25 561     M25 461                   Subjective: patient reports one day of significant soreness following last session, but feels back to baseline today  No change in symptoms  3-4/10 pain  Objective: See treatment diary below      Assessment: Patient presented with increased soft tissue tenderness along lateral quad and IT band  She reported less pain with ambulation following STM and quad stretching  Progressed LE strengthening exercises with addition of minisquats, clamshells, and resistance band with Bridges  Patient would benefit from continued skilled PT per plan of care  Plan: Continue per plan of care        Precautions: EPOC 22    Manuals 5/3 5/5 5/10          A-P mobs tib femoral jt gr 3-4 MIKEY            Knee ext MWM  MIKEY           STM quad  MIKEY MIKEY          IASTM IT band  MIKEY MIKEY          Neuro Re-Ed                          SLS             steamboats                                       Ther Ex             Heel slides  5x10" 5x15"          bike  5' lvl 1 8' lvl 1          Pt edu on plan of care, pathology, home program 8' 5'                        IT band stretch  15"           Prone quad stretch 3x15" 3x15" 5x20"          SLR abd  2x10 2x10          SLR 10x 10x 2x10          clamshells   2x10 rtb          Bridges with abd  10x 2x10 rtb          Adductor squeeze  15x                                     Ther Activity             minisquats   10x                       Gait Training                                       Modalities

## 2022-05-12 ENCOUNTER — OFFICE VISIT (OUTPATIENT)
Dept: PHYSICAL THERAPY | Facility: CLINIC | Age: 52
End: 2022-05-12
Payer: COMMERCIAL

## 2022-05-12 DIAGNOSIS — M25.461 PAIN AND SWELLING OF RIGHT KNEE: Primary | ICD-10-CM

## 2022-05-12 DIAGNOSIS — M25.561 PAIN AND SWELLING OF RIGHT KNEE: Primary | ICD-10-CM

## 2022-05-12 PROCEDURE — 97140 MANUAL THERAPY 1/> REGIONS: CPT | Performed by: PHYSICAL THERAPIST

## 2022-05-12 PROCEDURE — 97110 THERAPEUTIC EXERCISES: CPT | Performed by: PHYSICAL THERAPIST

## 2022-05-12 NOTE — PROGRESS NOTES
Daily Note     Today's date: 2022  Patient name: Giovanni Brownlee  : 1970  MRN: [id number]  Referring provider: BESSIE Snow  Dx:   Encounter Diagnosis     ICD-10-CM    1  Pain and swelling of right knee  M25 561     M25 461                   Subjective: patient reports that she didn't have as much soreness after last session and actually feels a little better today  She reports 2/10 pain currently  Objective: See treatment diary below      Assessment: Patient presented with less soft tissue restriction and tenderness compared to previous session  Progressed LE strengthening with increase in resistance for SLR flexion and abduction  She noted fatigue but not significant pain  She required cues for proper hip position during  Patient would benefit from continued skilled PT per plan of care  Plan: Continue per plan of care        Precautions: EPOC 22    Manuals 5/3 5/5 5/10 5/12         A-P mobs tib femoral jt gr 3-4 MIKEY            Knee ext MWM  MIKEY           STM quad  MIKEY MIKEY MIKEY         IASTM IT band  MIKEY MIKEY MIKEY         Neuro Re-Ed                          SLS    3x30"         steamboats    10 ea                                   Ther Ex             Heel slides  5x10" 5x15"          bike  5' lvl 1 8' lvl 1 8' lvl 1         Pt edu on plan of care, pathology, home program 8' 5'                        IT band stretch  15"           Prone quad stretch 3x15" 3x15" 5x20" 3x30"         SLR abd  2x10 2x10 2x10 1#         SLR 10x 10x 2x10 2x10 1#         clamshells   2x10 rtb 2x10 rtb         Bridges with abd  10x 2x10 rtb 3x10 rtb         Adductor squeeze  15x                                     Ther Activity             minisquats   10x          Wall squats    10x         Gait Training                                       Modalities

## 2022-05-17 ENCOUNTER — APPOINTMENT (OUTPATIENT)
Dept: PHYSICAL THERAPY | Facility: CLINIC | Age: 52
End: 2022-05-17
Payer: COMMERCIAL

## 2022-05-19 ENCOUNTER — OFFICE VISIT (OUTPATIENT)
Dept: PHYSICAL THERAPY | Facility: CLINIC | Age: 52
End: 2022-05-19
Payer: COMMERCIAL

## 2022-05-19 DIAGNOSIS — M25.461 PAIN AND SWELLING OF RIGHT KNEE: Primary | ICD-10-CM

## 2022-05-19 DIAGNOSIS — M25.561 PAIN AND SWELLING OF RIGHT KNEE: Primary | ICD-10-CM

## 2022-05-19 PROCEDURE — 97110 THERAPEUTIC EXERCISES: CPT | Performed by: PHYSICAL THERAPIST

## 2022-05-19 PROCEDURE — 97140 MANUAL THERAPY 1/> REGIONS: CPT | Performed by: PHYSICAL THERAPIST

## 2022-05-19 PROCEDURE — 97112 NEUROMUSCULAR REEDUCATION: CPT | Performed by: PHYSICAL THERAPIST

## 2022-05-19 NOTE — PROGRESS NOTES
Daily Note     Today's date: 2022  Patient name: Davonte Rodriguez  : 1970  MRN: [id number]  Referring provider: BESSIE Bradley  Dx:   Encounter Diagnosis     ICD-10-CM    1  Pain and swelling of right knee  M25 561     M25 461                   Subjective: patient reports that she has some more tightness/pain today due to doing a lot of work in the garden  However, she has less whole LE pain like she use to get  Objective: See treatment diary below      Assessment: Patient was able to tolerate exercise progression well without provocation despite being more sore today  She continues to require cues for proper knee position with squats  Educated patient on seft soft tissue mobilization of IT band  Patient would benefit from continued skilled PT per plan of care  Plan: Continue per plan of care        Precautions: EPOC 22    Manuals 5/3 5/5 5/10 5/12 5/19        A-P mobs tib femoral jt gr 3-4 MIKEY            Knee ext MWM  MIKEY           STM quad  MIKEY MIKEY MIKEY MIKEY        IASTM IT band  MIKEY MIKEY MIKEY MIKEY        Neuro Re-Ed                          SLS    3x30" 3x30" foam        steamboats    10 ea 10 ea                                  Ther Ex             Heel slides  5x10" 5x15"          bike  5' lvl 1 8' lvl 1 8' lvl 1 8' lvl 2        Pt edu on plan of care, pathology, home program 8' 5'                        IT band stretch  15"           Prone quad stretch 3x15" 3x15" 5x20" 3x30" 3x30"        SLR abd  2x10 2x10 2x10 1# 2x10 2#        SLR 10x 10x 2x10 2x10 1# 2x10 2#        clamshells   2x10 rtb 2x10 rtb 3x10 gtb        Bridges with abd  10x 2x10 rtb 3x10 rtb 3x10 gtb        Adductor squeeze  15x                                     Ther Activity             minisquats   10x          Wall squats    10x 2x10 gtb        Gait Training                                       Modalities

## 2022-05-24 ENCOUNTER — APPOINTMENT (OUTPATIENT)
Dept: PHYSICAL THERAPY | Facility: CLINIC | Age: 52
End: 2022-05-24
Payer: COMMERCIAL

## 2022-05-25 ENCOUNTER — OFFICE VISIT (OUTPATIENT)
Dept: PHYSICAL THERAPY | Facility: CLINIC | Age: 52
End: 2022-05-25
Payer: COMMERCIAL

## 2022-05-25 DIAGNOSIS — M25.461 PAIN AND SWELLING OF RIGHT KNEE: Primary | ICD-10-CM

## 2022-05-25 DIAGNOSIS — M25.561 PAIN AND SWELLING OF RIGHT KNEE: Primary | ICD-10-CM

## 2022-05-25 PROCEDURE — 97110 THERAPEUTIC EXERCISES: CPT | Performed by: PHYSICAL THERAPIST

## 2022-05-25 PROCEDURE — 97140 MANUAL THERAPY 1/> REGIONS: CPT | Performed by: PHYSICAL THERAPIST

## 2022-05-25 PROCEDURE — 97112 NEUROMUSCULAR REEDUCATION: CPT | Performed by: PHYSICAL THERAPIST

## 2022-05-25 NOTE — PROGRESS NOTES
Daily Note     Today's date: 2022  Patient name: Tiffanie Interiano  : 1970  MRN: [id number]  Referring provider: BESSIE Katz  Dx:   Encounter Diagnosis     ICD-10-CM    1  Pain and swelling of right knee  M25 561     M25 461                   Subjective: patient reports that her pain is less than it use to be, now it is more of an ache  She doesn't feel like the pain is as intense where it prevents her from doing her work  She hasn't had any big flare ups in pain recently even though she has been pretty active  Objective: See treatment diary below      Assessment: Patient continues to present with less soft tissue restriction and tenderness along quad and IT band  She required cues for proper knee position near end of sets with squats and lateral step ups, possibly due to fatigue as LE shifted into externally rotated position  She noted increased gluteal fatigue with steamboat progression, but overall no significant aggravation of symptoms  Patient would benefit from continued skilled PT per plan of care  Plan: Continue per plan of care  Progressed closed chain strengthening as able       Precautions: EPOC 22    Manuals 5/3 5/5 5/10 5/12 5/19 5/25       A-P mobs tib femoral jt gr 3-4 MIKEY            Knee ext MWM  MIKEY           STM quad  MIKEY MIKEY MIKEY MIKEY MIKEY       IASTM IT band  MIKEY MIKEY MIKEY MIKEY MIKEY       Neuro Re-Ed                          SLS    3x30" 3x30" foam 3x30" foam       steamboats    10 ea 10 ea 10 ea foam                                 Ther Ex             Heel slides  5x10" 5x15"          bike  5' lvl 1 8' lvl 1 8' lvl 1 8' lvl 2 10' lvl 2       Pt edu on plan of care, pathology, home program 8' 5'                        IT band stretch  15"           Prone quad stretch 3x15" 3x15" 5x20" 3x30" 3x30" 3x30"       SLR abd  2x10 2x10 2x10 1# 2x10 2# 3x10 2#       SLR 10x 10x 2x10 2x10 1# 2x10 2# 3x10 2#       clamshells   2x10 rtb 2x10 rtb 3x10 gtb 3x10 gtb       Bridges with abd  10x 2x10 rtb 3x10 rtb 3x10 gtb 3x10 gtb       Adductor squeeze  15x                                     Ther Activity             Lateral step ups      lvl 2 10x       minisquats   10x          Wall squats    10x 2x10 gtb 2x15 gtb       Gait Training                                       Modalities

## 2022-05-26 ENCOUNTER — OFFICE VISIT (OUTPATIENT)
Dept: PHYSICAL THERAPY | Facility: CLINIC | Age: 52
End: 2022-05-26
Payer: COMMERCIAL

## 2022-05-26 DIAGNOSIS — M25.461 PAIN AND SWELLING OF RIGHT KNEE: Primary | ICD-10-CM

## 2022-05-26 DIAGNOSIS — M25.561 PAIN AND SWELLING OF RIGHT KNEE: Primary | ICD-10-CM

## 2022-05-26 PROCEDURE — 97112 NEUROMUSCULAR REEDUCATION: CPT | Performed by: PHYSICAL THERAPIST

## 2022-05-26 PROCEDURE — 97140 MANUAL THERAPY 1/> REGIONS: CPT | Performed by: PHYSICAL THERAPIST

## 2022-05-26 PROCEDURE — 97110 THERAPEUTIC EXERCISES: CPT | Performed by: PHYSICAL THERAPIST

## 2022-05-26 NOTE — PROGRESS NOTES
Daily Note     Today's date: 2022  Patient name: Holley Hargrove  : 1970  MRN: [id number]  Referring provider: BESSIE Henderson  Dx:   Encounter Diagnosis     ICD-10-CM    1  Pain and swelling of right knee  M25 561     M25 461                   Subjective: patient reports that she was able to sit with her legs crossed over to the side this morning without pain, and she hasn't been able to sit like this in awhile  "I think we are heading in the right direction "      Objective: See treatment diary below      Assessment: Progressed closed chain strengthening and stability exercises with addition of elliptical, and posterior and lateral sliders  She noted fatigue and difficulty but only mild pain  Patient noted less difficulty with steamboats compared to previous session  Patient would benefit from continued skilled PT per plan of care  Plan: Continue per plan of care  Progressed closed chain strengthening as able       Precautions: EPOC 22    Manuals 5/3 5/5 5/10 5/12 5/19 5/25 5/26      A-P mobs tib femoral jt gr 3-4 MIKEY      MIKEY      Knee ext MWM  MIKEY     Prone flexion MIKEY      STM quad  MIKEY MIKEY MIKEY MIKEY MIKEY MIKEY      IASTM IT band  MIKEY MIKEY MIKEY MIKEY MIKEY MIKEY      Neuro Re-Ed                          SLS    3x30" 3x30" foam 3x30" foam 3x30" foam      steamboats    10 ea 10 ea 10 ea foam 10 ea foam                                Ther Ex             Heel slides  5x10" 5x15"          Elliptical        5' lvl 1      bike  5' lvl 1 8' lvl 1 8' lvl 1 8' lvl 2 10' lvl 2       Pt edu on plan of care, pathology, home program 8' 5'           Hamstring stretch       3x30"      Prone quad stretch 3x15" 3x15" 5x20" 3x30" 3x30" 3x30" 3x30"      SLR abd  2x10 2x10 2x10 1# 2x10 2# 3x10 2# 3x10 2#      SLR 10x 10x 2x10 2x10 1# 2x10 2# 3x10 2# 3x10 2#      clamshells   2x10 rtb 2x10 rtb 3x10 gtb 3x10 gtb       Bridges with abd  10x 2x10 rtb 3x10 rtb 3x10 gtb 3x10 gtb       Adductor squeeze  15x           gastroc stretch 3x30"                   Ther Activity             Lateral slider       10x      Posterior slider       10x      Lateral step ups      lvl 2 10x       minisquats   10x          Wall squats    10x 2x10 gtb 2x15 gtb       Gait Training                                       Modalities

## 2022-05-31 ENCOUNTER — OFFICE VISIT (OUTPATIENT)
Dept: PHYSICAL THERAPY | Facility: CLINIC | Age: 52
End: 2022-05-31
Payer: COMMERCIAL

## 2022-05-31 DIAGNOSIS — M25.561 PAIN AND SWELLING OF RIGHT KNEE: Primary | ICD-10-CM

## 2022-05-31 DIAGNOSIS — M25.461 PAIN AND SWELLING OF RIGHT KNEE: Primary | ICD-10-CM

## 2022-05-31 PROCEDURE — 97530 THERAPEUTIC ACTIVITIES: CPT | Performed by: PHYSICAL THERAPIST

## 2022-05-31 PROCEDURE — 97110 THERAPEUTIC EXERCISES: CPT | Performed by: PHYSICAL THERAPIST

## 2022-05-31 PROCEDURE — 97140 MANUAL THERAPY 1/> REGIONS: CPT | Performed by: PHYSICAL THERAPIST

## 2022-06-02 ENCOUNTER — OFFICE VISIT (OUTPATIENT)
Dept: PHYSICAL THERAPY | Facility: CLINIC | Age: 52
End: 2022-06-02
Payer: COMMERCIAL

## 2022-06-02 DIAGNOSIS — M25.461 PAIN AND SWELLING OF RIGHT KNEE: Primary | ICD-10-CM

## 2022-06-02 DIAGNOSIS — M25.561 PAIN AND SWELLING OF RIGHT KNEE: Primary | ICD-10-CM

## 2022-06-02 PROCEDURE — 97140 MANUAL THERAPY 1/> REGIONS: CPT | Performed by: PHYSICAL THERAPIST

## 2022-06-02 PROCEDURE — 97110 THERAPEUTIC EXERCISES: CPT | Performed by: PHYSICAL THERAPIST

## 2022-06-02 PROCEDURE — 97530 THERAPEUTIC ACTIVITIES: CPT | Performed by: PHYSICAL THERAPIST

## 2022-06-02 NOTE — PROGRESS NOTES
Daily Note     Today's date: 2022  Patient name: Tiffanie Interiano  : 1970  MRN: [id number]  Referring provider: BESSIE Katz  Dx:   Encounter Diagnosis     ICD-10-CM    1  Pain and swelling of right knee  M25 561     M25 461                   Subjective: patient reports that she was a little sore following last session and continues to be so  She also notes difficulty walking quickly on uneven surfaces like her lawn, and that she has to be conscious of how she walks  Objective: See treatment diary below      Assessment: Patient presented with less soft tissue tenderness along IT band compared to previous session  She noted pain during posterior sliders but this reduced when cued to push through heel to engage gluteus sharon  She had less pain and required fewer cues for knee form with wall squats today  Patient would benefit from continued skilled PT per plan of care to improve LE strength and stability  Plan: Continue per plan of care  Progressed closed chain strengthening as able        Precautions: EPOC 22    Manuals 5/3 5/5 5/10 5/12 5/19 5/25 5/26 5/31 6/2    A-P mobs tib femoral jt gr 3-4 MIKEY      MIKEY      Knee ext MWM  MIKEY     Prone flexion MIKEY      STM quad  MIKEY MIKEY MIKEY MIKEY MIKEY MIKEY MIKEY MIKEY    IASTM IT band  MIKEY MIKEY MIKEY MIKEY MIKEY MIKEY MIKEY MIKEY    Neuro Re-Ed                          SLS    3x30" 3x30" foam 3x30" foam 3x30" foam 3x30" foam 3x30" foam    steamboats    10 ea 10 ea 10 ea foam 10 ea foam      Lateral walks         2x20ft gtb                 Ther Ex             Heel slides  5x10" 5x15"          Elliptical        5' lvl 1      bike  5' lvl 1 8' lvl 1 8' lvl 1 8' lvl 2 10' lvl 2  10' lvl 3-4 10' lvl 3    Pt edu on plan of care, pathology, home program 8' 5'      3'     Hamstring stretch       3x30"      Prone quad stretch 3x15" 3x15" 5x20" 3x30" 3x30" 3x30" 3x30" 3x30" 3x30"    SLR abd  2x10 2x10 2x10 1# 2x10 2# 3x10 2# 3x10 2#      Single leg press          35-55# with gtb pull 2x10    SLR 10x 10x 2x10 2x10 1# 2x10 2# 3x10 2# 3x10 2# 3x10 2#     clamshells   2x10 rtb 2x10 rtb 3x10 gtb 3x10 gtb       Bridges with abd  10x 2x10 rtb 3x10 rtb 3x10 gtb 3x10 gtb  3x10 gtb     gastroc stretch       3x30" 3x30"     Piriformis stretch         3x30"    Ther Activity             Lateral slider       10x 10x     Posterior slider       10x 10x 10x    Lateral step ups      lvl 2 10x       minisquats   10x          Wall squats    10x 2x10 gtb 2x15 gtb  2x12 gtb 2x15 gtb    [employer]

## 2022-06-06 ENCOUNTER — OFFICE VISIT (OUTPATIENT)
Dept: PHYSICAL THERAPY | Facility: CLINIC | Age: 52
End: 2022-06-06
Payer: COMMERCIAL

## 2022-06-06 ENCOUNTER — OFFICE VISIT (OUTPATIENT)
Dept: FAMILY MEDICINE CLINIC | Facility: CLINIC | Age: 52
End: 2022-06-06
Payer: COMMERCIAL

## 2022-06-06 VITALS
SYSTOLIC BLOOD PRESSURE: 120 MMHG | HEIGHT: 67 IN | WEIGHT: 216.1 LBS | DIASTOLIC BLOOD PRESSURE: 80 MMHG | RESPIRATION RATE: 16 BRPM | HEART RATE: 89 BPM | BODY MASS INDEX: 33.92 KG/M2

## 2022-06-06 DIAGNOSIS — H26.9 CATARACT, UNSPECIFIED CATARACT TYPE, UNSPECIFIED LATERALITY: ICD-10-CM

## 2022-06-06 DIAGNOSIS — M25.461 PAIN AND SWELLING OF RIGHT KNEE: Primary | ICD-10-CM

## 2022-06-06 DIAGNOSIS — Z01.818 PREOP EXAMINATION: Primary | ICD-10-CM

## 2022-06-06 DIAGNOSIS — M25.561 PAIN AND SWELLING OF RIGHT KNEE: Primary | ICD-10-CM

## 2022-06-06 PROCEDURE — 97110 THERAPEUTIC EXERCISES: CPT | Performed by: PHYSICAL THERAPIST

## 2022-06-06 PROCEDURE — 1036F TOBACCO NON-USER: CPT | Performed by: PHYSICIAN ASSISTANT

## 2022-06-06 PROCEDURE — 97530 THERAPEUTIC ACTIVITIES: CPT | Performed by: PHYSICAL THERAPIST

## 2022-06-06 PROCEDURE — 97112 NEUROMUSCULAR REEDUCATION: CPT | Performed by: PHYSICAL THERAPIST

## 2022-06-06 PROCEDURE — 3008F BODY MASS INDEX DOCD: CPT | Performed by: PHYSICIAN ASSISTANT

## 2022-06-06 PROCEDURE — 99214 OFFICE O/P EST MOD 30 MIN: CPT | Performed by: PHYSICIAN ASSISTANT

## 2022-06-06 RX ORDER — LORATADINE 10 MG/1
10 TABLET ORAL DAILY PRN
COMMUNITY

## 2022-06-06 RX ORDER — FEXOFENADINE HCL 180 MG/1
180 TABLET ORAL DAILY PRN
COMMUNITY

## 2022-06-06 NOTE — PROGRESS NOTES
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 in/out of high tractor which is 3 feet of the ground  She has some soreness but expected it to be much worse  She reports that she is pleasantly surprised by how her knee feels today considering the work she did  Objective: See treatment diary below      Assessment: Patient presented with less soft tissue restrictions along IT band and quadriceps  Patient continues to report knee pain with slider exercises, so only 5 reps were performed  She was able to perform wall sits and lateral step ups without knee pain however, but appropriate muscle fatigue  Patient is making good progress with therapy and would benefit from continued skilled PT per plan of care to improve LE strength and stability  Plan: Continue per plan of care  Progressed closed chain strengthening as able        Precautions: EPOC 22    Manuals 5/3 5/5 5/10 5/12 5/19 5/25 5/26 5/31 6/2 6/6   A-P mobs tib femoral jt gr 3-4 MIKEY      MIKEY      Knee ext MWM  MIKEY     Prone flexion MIKEY      STM quad  MIKEY MIKEY MIKEY MIKEY MIKEY MIKEY MIKEY MIKEY MIKEY   IASTM IT band  MIKEY MIKEY MIKEY MIKEY MIKEY MIKEY MIKEY MIKEY MIKEY   Neuro Re-Ed                          SLS    3x30" 3x30" foam 3x30" foam 3x30" foam 3x30" foam 3x30" foam 3x30" foam   steamboats    10 ea 10 ea 10 ea foam 10 ea foam   10 ea foam   Lateral walks         2x20ft gtb 3x20 ft gtb                Ther Ex             Heel slides  5x10" 5x15"          Elliptical        5' lvl 1   5' lvl 1   bike  5' lvl 1 8' lvl 1 8' lvl 1 8' lvl 2 10' lvl 2  10' lvl 3-4 10' lvl 3    Pt edu on plan of care, pathology, home program 8' 5'      3'  2'   Hamstring stretch       3x30"      Prone quad stretch 3x15" 3x15" 5x20" 3x30" 3x30" 3x30" 3x30" 3x30" 3x30" 3x30"   SLR abd  2x10 2x10 2x10 1# 2x10 2# 3x10 2# 3x10 2#      Wall sit          2x20" gtb   Single leg press          35-55# with gtb pull 2x10    SLR 10x 10x 2x10 2x10 1# 2x10 2# 3x10 2# 3x10 2# 3x10 2#     clamshells   2x10 rtb 2x10 rtb 3x10 gtb 3x10 gtb       Bridges with abd  10x 2x10 rtb 3x10 rtb 3x10 gtb 3x10 gtb  3x10 gtb     gastroc stretch       3x30" 3x30"     Piriformis stretch         3x30" 3x30  "   Ther Activity             Lateral slider       10x 10x  5x   Posterior slider       10x 10x 10x 5x   Lateral step ups      lvl 2 10x    20x 8in   minisquats   10x          Wall squats    10x 2x10 gtb 2x15 gtb  2x12 gtb 2x15 gtb 3x10 gtb   Gait Training                                       Modalities

## 2022-06-06 NOTE — PROGRESS NOTES
FAMILY PRACTICE PRE-OPERATIVE EVALUATION  [facility] - [facility] PRACTICE    NAME: Alan rTent  AGE: 46 y o  SEX: female  : 1970     DATE: 2022    Family Practice Pre-Operative Evaluation      Chief Complaint: Pre-operative Evaluation     Surgery: cataract extraction with insertion IOL implant  Anticipated Date of Surgery: 2022  Referring Provider: Dr Abdiel Sykes     History of Present Illness:     Alan Trent is a 46 y o  female who presents to the office today for a preoperative consultation at the request of surgeon, Dr Abdiel Sykes , who plans on performing cataract removal with IOL implant on 2022  Planned anesthesia is topical/TIVA  Patient has a bleeding risk of: no recent abnormal bleeding  Patient does not have objections to receiving blood products if needed  Current anti-platelet/anti-coagulation medications that the patient is prescribed includes: none  Assessment of Chronic Conditions:     - allergic rhinitis - taking allegra and claritin daily     Assessment of Cardiac Risk:  · Denies unstable or severe angina or MI in the last 6 weeks or history of stent placement in the last year   · Denies decompensated heart failure (e g  New onset heart failure, NYHA functional class IV heart failure, or worsening existing heart failure)  · Denies significant arrhythmias such as high grade AV block, symptomatic ventricular arrhythmia, newly recognized ventricular tachycardia, supraventricular tachycardia with resting heart rate >100, or symptomatic bradycardia  · Denies severe heart valve disease including aortic stenosis or symptomatic mitral stenosis     Exercise Capacity:  · Able to walk 4 blocks without symptoms?: Yes  · Able to walk 2 flights without symptoms?: Yes    Prior Anesthesia Reactions: Yes, trouble walking     Personal history of venous thromboembolic disease? No    History of steroid use for >2 weeks within last year?  No Review of Systems:     Review of Systems   Constitutional: Negative  HENT: Negative  Eyes: Positive for visual disturbance  Respiratory: Negative  Cardiovascular: Negative  Gastrointestinal: Negative  Endocrine: Negative  Genitourinary: Negative  Musculoskeletal: Negative  Skin: Negative  Allergic/Immunologic: Negative  Neurological: Negative  Hematological: Negative  Psychiatric/Behavioral: Negative  Current Problem List:     Patient Active Problem List   Diagnosis    Varicose veins of right leg with edema       Allergies: Allergies   Allergen Reactions    Penicillins      Pt is unaware what reaction       Current Medications:       Current Outpatient Medications:     famotidine (PEPCID) 20 mg tablet, Take 1 tablet (20 mg total) by mouth 2 (two) times a day before meals (Patient taking differently: Take 20 mg by mouth as needed), Disp: 30 tablet, Rfl: 0    fexofenadine (ALLEGRA) 180 MG tablet, Take 180 mg by mouth daily as needed, Disp: , Rfl:     loratadine (CLARITIN) 10 mg tablet, Take 10 mg by mouth daily as needed for allergies, Disp: , Rfl:     multivitamin (THERAGRAN) TABS, Take 1 tablet by mouth daily, Disp: , Rfl:     Past Medical History:       History reviewed  No pertinent past medical history       Past Surgical History:   Procedure Laterality Date    COLONOSCOPY      CYST REMOVAL Right     middle finger on hand    DILATION AND CURETTAGE OF UTERUS          Family History   Problem Relation Age of Onset    Colon cancer Mother     Depression Mother     Substance Abuse Father     Leukemia Maternal Grandmother     Mental illness Neg Hx         Social History     Socioeconomic History    Marital status: /Civil Union     Spouse name: Not on file    Number of children: Not on file    Years of education: Not on file    Highest education level: Not on file   Occupational History    Not on file   Tobacco Use    Smoking status: Never Smoker    Smokeless tobacco: Never Used   Vaping Use    Vaping Use: Never used   Substance and Sexual Activity    Alcohol use: No    Drug use: No    Sexual activity: Not on file   Other Topics Concern    Not on file   Social History Narrative    Not on file     Social Determinants of Health     Financial Resource Strain: Not on file   Food Insecurity: Not on file   Transportation Needs: Not on file   Physical Activity: Not on file   Stress: Not on file   Social Connections: Not on file   Intimate Partner Violence: Not on file   Housing Stability: Not on file        Physical Exam:     /80   Pulse 89   Resp 16   Ht 5' 7 25" (1 708 m)   Wt 98 kg (216 lb 1 6 oz)   BMI 33 59 kg/m²     Physical Exam  Constitutional:       Appearance: Normal appearance  She is well-developed  HENT:      Head: Normocephalic and atraumatic  Eyes:      Conjunctiva/sclera: Conjunctivae normal       Pupils: Pupils are equal, round, and reactive to light  Neck:      Thyroid: No thyromegaly  Cardiovascular:      Rate and Rhythm: Normal rate and regular rhythm  Pulses: Normal pulses  Heart sounds: Normal heart sounds  No murmur heard  Pulmonary:      Effort: Pulmonary effort is normal  No respiratory distress  Breath sounds: Normal breath sounds  No wheezing or rales  Abdominal:      General: Abdomen is flat  Bowel sounds are normal  There is no distension  Palpations: Abdomen is soft  There is no mass  Tenderness: There is no abdominal tenderness  Musculoskeletal:         General: Normal range of motion  Cervical back: Normal range of motion and neck supple  Lymphadenopathy:      Cervical: No cervical adenopathy  Skin:     General: Skin is warm and dry  Neurological:      General: No focal deficit present  Mental Status: She is alert and oriented to person, place, and time  Cranial Nerves: No cranial nerve deficit        Deep Tendon Reflexes: Reflexes are normal and symmetric  Psychiatric:         Mood and Affect: Mood normal          Behavior: Behavior normal          Thought Content: Thought content normal          Judgment: Judgment normal           Data:     Pre-operative work-up    Laboratory Results: none     EKG: not indicated    Chest x-ray: not indicated      Previous cardiopulmonary studies within the past year:  · Echocardiogram: none  · Cardiac Catheterization:  none  · Stress Test: none  · Pulmonary Function Testing: none      Assessment & Recommendations:       Pre-Op Evaluation Assessment  46 y o  female with planned surgery:   Known risk factors for perioperative complications: None  Current medications which may produce withdrawal symptoms if withheld perioperatively: none  Pre-Op Evaluation Plan  1  Further preoperative workup as follows:   - None; no further preoperative work-up is required    2  Medication Management/Recommendations:   - Not applicable, not on any medications    3  Prophylaxis for cardiac events with perioperative beta-blockers: not indicated  4  Patient requires further consultation with: None    Clearance  Patient is CLEARED for surgery without any additional cardiac testing       Annelise Starr PA-C  [place] [address] [facility] [address]  Phone#  [phone]  Fax#  [phone]

## 2022-06-08 ENCOUNTER — OFFICE VISIT (OUTPATIENT)
Dept: PHYSICAL THERAPY | Facility: CLINIC | Age: 52
End: 2022-06-08
Payer: COMMERCIAL

## 2022-06-08 DIAGNOSIS — M25.561 PAIN AND SWELLING OF RIGHT KNEE: Primary | ICD-10-CM

## 2022-06-08 DIAGNOSIS — M25.461 PAIN AND SWELLING OF RIGHT KNEE: Primary | ICD-10-CM

## 2022-06-08 PROCEDURE — 97110 THERAPEUTIC EXERCISES: CPT | Performed by: PHYSICAL THERAPIST

## 2022-06-08 PROCEDURE — 97140 MANUAL THERAPY 1/> REGIONS: CPT | Performed by: PHYSICAL THERAPIST

## 2022-06-08 NOTE — PROGRESS NOTES
Daily Note     Today's date: 2022  Patient name: Moo Bermudez  : 1970  MRN: [id number]  Referring provider: BESSIE Chung  Dx:   Encounter Diagnosis     ICD-10-CM    1  Pain and swelling of right knee  M25 561     M25 461                   Subjective: patient reports that her knee is sore today again, 4-5/10 pain  She started feeling tight yesterday, and was unable to perform quad stretching without pain  Objective: See treatment diary below      Assessment: Patient presented with increased pain and irritability today  Educated patient on performing quad stretches in painfree range, with shorter hold times if need be, at least 3x per day due to persistent quad muscle tightness  Also instructed patient in standing quad stretch variation to improve accessibility for stretching while working outside in the middle of the day  She verbalized understanding  Program was modified today due to increase in pain noted  Patient overall is making good progress with therapy and would benefit from continued skilled PT per plan of care to improve LE strength and stability  Plan: Continue per plan of care  Progressed closed chain strengthening as able        Precautions: EPOC 22    Manuals    A-P mobs tib femoral jt gr 3-4 MIKEY            Knee ext MWM             STM quad MIKEY       MIKEY MIKEY MIKEY   IASTM IT band MIKEY       MIKEY MIKEY MIKEY   Neuro Re-Ed                          SLS        3x30" foam 3x30" foam 3x30" foam   steamboats          10 ea foam   Lateral walks 2x20ft gtb        2x20ft gtb 3x20 ft gtb                Ther Ex             Heel slides             Elliptical           5' lvl 1   bike 8' lvl 1-2       10' lvl 3-4 10' lvl 3    Pt edu on plan of care, pathology, home program 5' HEP modifications       3'  2'   Hamstring stretch             Prone quad stretch 5x15"       3x30" 3x30" 3x30"   SLR abd             Wall sit nv         2x20" gtb   Single leg press  45-85# gtb pull 3x10        35-55# with gtb pull 2x10    SLR        3x10 2#     clamshells             Bridges with abd        3x10 gtb     gastroc stretch        3x30"     Piriformis stretch 3x30"        3x30" 3x30  "   Ther Activity             Lateral slider        10x  5x   Posterior slider        10x 10x 5x   Lateral step ups          20x 8in   Wall squats 10x gtb       2x12 gtb 2x15 gtb 3x10 gtb   Gait Training                                       Modalities

## 2022-06-16 ENCOUNTER — OFFICE VISIT (OUTPATIENT)
Dept: PHYSICAL THERAPY | Facility: CLINIC | Age: 52
End: 2022-06-16
Payer: COMMERCIAL

## 2022-06-16 DIAGNOSIS — M25.561 PAIN AND SWELLING OF RIGHT KNEE: Primary | ICD-10-CM

## 2022-06-16 DIAGNOSIS — M25.461 PAIN AND SWELLING OF RIGHT KNEE: Primary | ICD-10-CM

## 2022-06-16 PROCEDURE — 97112 NEUROMUSCULAR REEDUCATION: CPT | Performed by: PHYSICAL THERAPIST

## 2022-06-16 PROCEDURE — 97140 MANUAL THERAPY 1/> REGIONS: CPT | Performed by: PHYSICAL THERAPIST

## 2022-06-16 PROCEDURE — 97110 THERAPEUTIC EXERCISES: CPT | Performed by: PHYSICAL THERAPIST

## 2022-06-16 NOTE — PROGRESS NOTES
Daily Note     Today's date: 2022  Patient name: Marah Durbin  : 1970   MRN: [id number]  Referring provider: BESSIE Khan  Dx:   Encounter Diagnosis     ICD-10-CM    1  Pain and swelling of right knee  M25 561     M25 461                   Subjective: patient reports that her knee is about the same today  She had her eye surgery earlier this week so she has been taking it easy per their recommendations  Objective: See treatment diary below      Assessment: Increased time was spent with IASTM to quadriceps and manual quad stretching but she noted persistent tightness however following  Progressed resistance for abductor strengthening with clamshells, squats, and lateral walks  Patient overall is making good progress with therapy and would benefit from continued skilled PT per plan of care to improve LE strength and stability  Plan: Continue per plan of care  Progressed closed chain strengthening as able        Precautions: EPOC 22    Manuals    A-P mobs tib femoral jt gr 3-4 MIKEY            Knee ext MWM             STM quad MIKEY MIKEY      MIKEY MIKEY MIKEY   IASTM IT band MIKEY MIKEY      MIKEY MIKEY MIKEY   Neuro Re-Ed                          SLS  3x30" foam      3x30" foam 3x30" foam 3x30" foam   steamboats          10 ea foam   Lateral walks 2x20ft gtb 2x20ft bktb       2x20ft gtb 3x20 ft gtb                Ther Ex             Heel slides             Elliptical           5' lvl 1   bike 8' lvl 1-2 8' lvl 1-2      10' lvl 3-4 10' lvl 3    Pt edu on plan of care, pathology, home program 5' HEP modifications       3'  2'   Standing quad stretch  3x15"           Hamstring stretch             Prone quad stretch 5x15" 5x15"      3x30" 3x30" 3x30"   SLR abd             Wall sit nv         2x20" gtb   Single leg press  45-85# gtb pull 3x10        35-55# with gtb pull 2x10    SLR        3x10 2#     clamshells  3x10 bktb           Bridges with abd  3x10 bktb      3x10 gtb     gastroc stretch        3x30"     Piriformis stretch 3x30"        3x30" 3x30  "   Ther Activity             Lateral slider        10x  5x   Posterior slider        10x 10x 5x   Lateral step ups          20x 8in   Wall squats 10x gtb       2x12 gtb 2x15 gtb 3x10 gtb   Gait Training                                       Modalities

## 2022-06-21 ENCOUNTER — OFFICE VISIT (OUTPATIENT)
Dept: PHYSICAL THERAPY | Facility: CLINIC | Age: 52
End: 2022-06-21
Payer: COMMERCIAL

## 2022-06-21 DIAGNOSIS — M25.561 PAIN AND SWELLING OF RIGHT KNEE: Primary | ICD-10-CM

## 2022-06-21 DIAGNOSIS — M25.461 PAIN AND SWELLING OF RIGHT KNEE: Primary | ICD-10-CM

## 2022-06-21 PROCEDURE — 97110 THERAPEUTIC EXERCISES: CPT | Performed by: PHYSICAL THERAPIST

## 2022-06-21 PROCEDURE — 97112 NEUROMUSCULAR REEDUCATION: CPT | Performed by: PHYSICAL THERAPIST

## 2022-06-21 NOTE — PROGRESS NOTES
Daily Note     Today's date: 2022  Patient name: Rashad Gonsalez  : 1970   MRN: [id number]  Referring provider: BESSIE Hernandez  Dx:   Encounter Diagnosis     ICD-10-CM    1  Pain and swelling of right knee  M25 561     M25 461                   Subjective: patient reports that her knee is still achy, 4-5/10 pain  She reports no specific reason or cause of increased pain  Her pain is better than the start of PT but not as good as two weeks ago  Objective: See treatment diary below  Hamstrings WNL  IT band WNL  Moderate quad tightness  Posterior knee pain with passive quad stretch and active knee flexion in standing    Assessment: Patient continues to present with knee pain and limited quad flexibility  She was able to tolerate squats and lateral walks without significant aggravation however  Administered KT tape around patella to reduce pain and improve propioception  Instructed pain on indications for taping and to remove if any adverse reaction  Patient would benefit from continued skilled PT per plan of care to improve LE strength and stability  Plan: Continue per plan of care  Progressed closed chain strengthening as able        Precautions: EPOC 22    Manuals    A-P mobs tib femoral jt gr 3-4 MIKEY            Knee ext MWM             STM quad MIKEY MIKEY      MIKEY MIKEY MIKEY   IASTM IT band MIKEY MIKEY      MIKEY MIKEY MIKEY   Neuro Re-Ed             Kt tape to improve propioception   8'          SLS  3x30" foam      3x30" foam 3x30" foam 3x30" foam   steamboats   10 ea foam       10 ea foam   Lateral walks 2x20ft gtb 2x20ft bktb 3x20ft bltb      2x20ft gtb 3x20 ft gtb                Ther Ex             Heel slides             Elliptical           5' lvl 1   bike 8' lvl 1-2 8' lvl 1-2 8' lvl 1-2     10' lvl 3-4 10' lvl 3    Pt edu on plan of care, pathology, home program 5' HEP modifications       3'  2'   Standing quad stretch  3x15"           Hamstring stretch             Prone quad stretch 5x15" 5x15" 3x15"     3x30" 3x30" 3x30"   SLR abd             Wall sit nv         2x20" gtb   Single leg press  45-85# gtb pull 3x10        35-55# with gtb pull 2x10    SLR        3x10 2#     clamshells  3x10 bktb           Bridges with abd  3x10 bktb      3x10 gtb     gastroc stretch        3x30"     Piriformis stretch 3x30"        3x30" 3x30  "   Ther Activity             Lateral slider        10x  5x   Posterior slider        10x 10x 5x   Lateral step ups          20x 8in   Wall squats 10x gtb  3x10 gtb     2x12 gtb 2x15 gtb 3x10 gtb   Gait Training                                       Modalities

## 2022-06-23 ENCOUNTER — OFFICE VISIT (OUTPATIENT)
Dept: PHYSICAL THERAPY | Facility: CLINIC | Age: 52
End: 2022-06-23
Payer: COMMERCIAL

## 2022-06-23 DIAGNOSIS — M25.561 PAIN AND SWELLING OF RIGHT KNEE: Primary | ICD-10-CM

## 2022-06-23 DIAGNOSIS — M25.461 PAIN AND SWELLING OF RIGHT KNEE: Primary | ICD-10-CM

## 2022-06-23 PROCEDURE — 97112 NEUROMUSCULAR REEDUCATION: CPT | Performed by: PHYSICAL THERAPIST

## 2022-06-23 PROCEDURE — 97110 THERAPEUTIC EXERCISES: CPT | Performed by: PHYSICAL THERAPIST

## 2022-06-23 PROCEDURE — 97140 MANUAL THERAPY 1/> REGIONS: CPT | Performed by: PHYSICAL THERAPIST

## 2022-06-23 NOTE — PROGRESS NOTES
Daily Note     Today's date: 2022  Patient name: Abigail Hess  : 1970   MRN: [id number]  Referring provider: BESSIE Reid  Dx:   Encounter Diagnosis     ICD-10-CM    1  Pain and swelling of right knee  M25 561     M25 461                   Subjective: patient reports that her knee is feeling better today  She was pretty active yesterday and didn't have much pain  She thinks the KT tape helped  She reports 2-3/10 pain today  Objective: See treatment diary below    Assessment: Patient noted less difficulty during stability exercises today, and no pain provocation with closed chain strengthening  Progressed resistance for single leg press which she tolerated well  Administered KT tape again to improve pain and proprioception  Patient would benefit from continued skilled PT per plan of care to improve LE strength and stability  Plan: Continue per plan of care  Progressed closed chain strengthening as able        Precautions: EPOC 22    Manuals    A-P mobs tib femoral jt gr 3-4 MIKEY   MIKEY         Knee ext MWM             STM quad MIKEY MIKEY      MIKEY MIKEY MIKEY   IASTM IT band MIKEY MIKYE      MIKEY MIKEY MIKEY   Neuro Re-Ed             Kt tape to improve propioception   8' 8'         SLS  3x30" foam      3x30" foam 3x30" foam 3x30" foam   steamboats   10 ea foam 10 ea foam      10 ea foam   Lateral walks 2x20ft gtb 2x20ft bktb 3x20ft bltb 3x20ft bltb     2x20ft gtb 3x20 ft gtb                Ther Ex             Heel slides             Elliptical           5' lvl 1   bike 8' lvl 1-2 8' lvl 1-2 8' lvl 1-2 8' lvl 1-2    10' lvl 3-4 10' lvl 3    Pt edu on plan of care, pathology, home program 5' HEP modifications       3'  2'   Standing quad stretch  3x15"           Hamstring stretch             Prone quad stretch 5x15" 5x15" 3x15" 3x15"    3x30" 3x30" 3x30"   SLR abd             Wall sit nv         2x20" gtb   Single leg press  45-85# gtb pull 3x10   105# 3x10     35-55# with gtb pull 2x10    SLR        3x10 2#     clamshells  3x10 bktb  3x10 bktb         Bridges with abd  3x10 bktb  3x10 bktb    3x10 gtb     gastroc stretch        3x30"     Piriformis stretch 3x30"        3x30" 3x30  "   Ther Activity             Lateral slider        10x  5x   Posterior slider        10x 10x 5x   Lateral step ups          20x 8in   Wall squats 10x gtb  3x10 gtb 3x10 gtb    2x12 gtb 2x15 gtb 3x10 gtb   Gait Training                                       Modalities

## 2022-06-27 ENCOUNTER — OFFICE VISIT (OUTPATIENT)
Dept: PHYSICAL THERAPY | Facility: CLINIC | Age: 52
End: 2022-06-27
Payer: COMMERCIAL

## 2022-06-27 DIAGNOSIS — M25.561 PAIN AND SWELLING OF RIGHT KNEE: Primary | ICD-10-CM

## 2022-06-27 DIAGNOSIS — M25.461 PAIN AND SWELLING OF RIGHT KNEE: Primary | ICD-10-CM

## 2022-06-27 PROCEDURE — 97140 MANUAL THERAPY 1/> REGIONS: CPT | Performed by: PHYSICAL THERAPY ASSISTANT

## 2022-06-27 PROCEDURE — 97110 THERAPEUTIC EXERCISES: CPT | Performed by: PHYSICAL THERAPY ASSISTANT

## 2022-06-27 NOTE — PROGRESS NOTES
Daily Note     Today's date: 2022  Patient name: Esequiel Hudson  : 1970   MRN: [id number]  Referring provider: BESSIE Capellan  Dx:   Encounter Diagnosis     ICD-10-CM    1  Pain and swelling of right knee  M25 561     M25 461                   Subjective: Pt reports increased pain today in medial knee  States she feels the tape is helping but that the tape peeled off yesterday and she had a very busy day  Objective: See treatment diary below    Assessment: No progressions this visit due to increased pain reports    Administered KT tape again to improve pain and proprioception  Tolerated TE as noted without increased sxs  Pt reports some pain relief post TE but notes that she did take ibuprofen prior to therapy this morning for pain  Patient would benefit from continued skilled PT per plan of care to improve LE strength and stability  Plan: Continue per plan of care  Progressed closed chain strengthening as able        Precautions: EPOC 22    Manuals    A-P mobs tib femoral jt gr 3-4 MIKEY   MIKEY         Knee ext MWM             STM quad MIKEY MIKEY      MIKEY MIKEY MIKEY   IASTM IT band MIKEY MIKEY      MIKEY MIKEY MIKEY   Neuro Re-Ed             Kt tape to improve propioception   8' 8' 8'        SLS  3x30" foam      3x30" foam 3x30" foam 3x30" foam   steamboats   10 ea foam 10 ea foam 10x ea foam     10 ea foam   Lateral walks 2x20ft gtb 2x20ft bktb 3x20ft bltb 3x20ft bltb 3x20'  Blue TB    2x20ft gtb 3x20 ft gtb                Ther Ex             Heel slides             Elliptical           5' lvl 1   bike 8' lvl 1-2 8' lvl 1-2 8' lvl 1-2 8' lvl 1-2 8'  L1   10' lvl 3-4 10' lvl 3    Pt edu on plan of care, pathology, home program 5' HEP modifications       3'  2'   Standing quad stretch  3x15"           Hamstring stretch             Prone quad stretch 5x15" 5x15" 3x15" 3x15" 3x15"   3x30" 3x30" 3x30"   SLR abd             Wall sit nv         2x20" gtb   Single leg press  45-85# gtb pull 3x10   105# 3x10 105# 3x10    35-55# with gtb pull 2x10    SLR        3x10 2#     clamshells  3x10 bktb  3x10 bktb 3x10 blk TB        Bridges with abd  3x10 bktb  3x10 bktb 3x10 blk TB   3x10 gtb     gastroc stretch        3x30"     Piriformis stretch 3x30"        3x30" 3x30  "   Ther Activity             Lateral slider        10x  5x   Posterior slider        10x 10x 5x   Lateral step ups          20x 8in   Wall squats 10x gtb  3x10 gtb 3x10 gtb    2x12 gtb 2x15 gtb 3x10 gtb   Gait Training                                       Modalities

## 2022-07-06 ENCOUNTER — OFFICE VISIT (OUTPATIENT)
Dept: PHYSICAL THERAPY | Facility: CLINIC | Age: 52
End: 2022-07-06
Payer: COMMERCIAL

## 2022-07-06 DIAGNOSIS — M25.561 PAIN AND SWELLING OF RIGHT KNEE: Primary | ICD-10-CM

## 2022-07-06 DIAGNOSIS — M25.461 PAIN AND SWELLING OF RIGHT KNEE: Primary | ICD-10-CM

## 2022-07-06 PROCEDURE — 97112 NEUROMUSCULAR REEDUCATION: CPT | Performed by: PHYSICAL THERAPIST

## 2022-07-06 PROCEDURE — 97110 THERAPEUTIC EXERCISES: CPT | Performed by: PHYSICAL THERAPIST

## 2022-07-06 NOTE — PROGRESS NOTES
Daily Note     Today's date: 2022  Patient name: Isabel Robles  : 1970   MRN: [id number]  Referring provider: BESSIE Contreras  Dx:   Encounter Diagnosis     ICD-10-CM    1  Pain and swelling of right knee  M25 561     M25 461                   Subjective: Pt reports that her pain has been worse over the past week  She reports pain localized to patellar tendon  She thinks the tape was administered differently last time, and may have made the pain worse  She notes increased pain following so she took it off after 1 day  She reports no change in activity lately but she has been having to take ibuprofen throughout the day  Objective: See treatment diary below     Assessment: Initiated knee extension isometric to reduce patellar tendon pain while improving quad strength  She noted no reduction in pain following  KT tape was administered again with closer proximity to patella and tendon  She noted feeling of lift over patella which was positive, but no immediate pain relief  Will re-evaluate next session and determine if more skilled PT is needed or if patient is appropriate for discharge to home program  Recommended patient contact referring physician for orthopedic referral due to persistent pain despite completing 2 months of PT  Plan: Continue per plan of care  Progressed closed chain strengthening as able        Precautions: EPOC 22    Manuals        A-P mobs tib femoral jt gr 3-4 MIKEY   MIKEY         Knee ext MWM             STM quad MIKEY MIKEY           IASTM IT band MIKEY MIKEY           Neuro Re-Ed             Kt tape to improve propioception   8' 8' 8' 8'       SLS  3x30" foam           steamboats   10 ea foam 10 ea foam 10x ea foam        Lateral walks 2x20ft gtb 2x20ft bktb 3x20ft bltb 3x20ft bltb 3x20'  Blue TB                     Ther Ex             Heel slides             Elliptical              bike 8' lvl 1-2 8' lvl 1-2 8' lvl 1-2 8' lvl 1-2 8'  L1 5' lvl 1 Pt edu on plan of care, pathology, home program 5' HEP modifications     Self administerring tape and contacting PCP       Standing quad stretch  3x15"           Hamstring stretch             Prone quad stretch 5x15" 5x15" 3x15" 3x15" 3x15" 3x15"       SLR abd             Wall sit nv            Knee ext isometric      3x30"       Single leg press  45-85# gtb pull 3x10   105# 3x10 105# 3x10        SLR             clamshells  3x10 bktb  3x10 bktb 3x10 blk TB        Bridges with abd  3x10 bktb  3x10 bktb 3x10 blk TB        gastroc stretch             Piriformis stretch 3x30"            Ther Activity             Lateral slider             Posterior slider             Lateral step ups             Wall squats 10x gtb  3x10 gtb 3x10 gtb         Gait Training                                       Modalities

## 2022-07-11 ENCOUNTER — OFFICE VISIT (OUTPATIENT)
Dept: PHYSICAL THERAPY | Facility: CLINIC | Age: 52
End: 2022-07-11
Payer: COMMERCIAL

## 2022-07-11 DIAGNOSIS — M25.461 PAIN AND SWELLING OF RIGHT KNEE: Primary | ICD-10-CM

## 2022-07-11 DIAGNOSIS — M25.561 PAIN AND SWELLING OF RIGHT KNEE: Primary | ICD-10-CM

## 2022-07-11 PROCEDURE — 97010 HOT OR COLD PACKS THERAPY: CPT | Performed by: PHYSICAL THERAPIST

## 2022-07-11 PROCEDURE — 97110 THERAPEUTIC EXERCISES: CPT | Performed by: PHYSICAL THERAPIST

## 2022-07-11 NOTE — PROGRESS NOTES
PT Re-Evaluation     Today's date: 2022  Patient name: Carina Gandara  : 1970  MRN: [id number]  Referring provider: BESSIE Nowak  Dx:   Encounter Diagnosis     ICD-10-CM    1  Pain and swelling of right knee  M25 561     M25 461                   Assessment  Assessment details: RE-EVALUATION 22: Patient has been seen for 17 visits over the past 2 months for R knee pain and swelling  Treatment has consisted of manual therapy to improve ROM and joint mobility, therapeutic exercises to improve LE strength and flexibility, neuromuscular reeducation to improve LE control, and patient education on home program  From an objective standpoint, patient has made significant progress in LE strength and knee stability  She continues to have moderate swelling and joint stiffness/muscle tightness compared to uninvolved side  Functionally, patient has less pain intensity and 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  She has experienced several flare ups throughout course of care, but reports least pain to date over the past few days  At this time, patient is appropriate for discharge to home program to maintain strength/stability progress and further improve ROM and flexibility deficits  She verbalized understanding to discharge recommendations and updated home program  She is in agreement with this plan and pleased with her progress  Impairments: abnormal or restricted ROM, abnormal movement, pain with function, poor posture  and poor body mechanics    Symptom irritability: lowUnderstanding of Dx/Px/POC: good   Prognosis: good    Goals  Short term goals:  Patient is independent in home program to support plan of care and improve function  - 2 weeks goal met  Patient demonstrates normal LE flexibility so she can walk in home with less pain  - 2 weeks, quad tightness, not met  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   - 2 weeks improved, not met    Long term goals:  Patient demonstrates improvement in community participation with increase in FOTO score by 20%  - 8 weeks partially met  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  - 6 weeks partially met, depending on the week  Patient demonstrates symmetrical knee stability in single limb stance so she can walk on uneven ground without pain  - 8 weeks goal met  Patient demonstrates 5/5 LE strength so she can descend stairs without pain  - 8 weeks partially met      Plan  Patient would benefit from: skilled physical therapy  Planned modality interventions: cryotherapy  Planned therapy interventions: activity modification, ADL training, balance, body mechanics training, flexibility, functional ROM exercises, home exercise program, therapeutic exercise, therapeutic activities, strengthening, stretching, patient education, neuromuscular re-education, massage, manual therapy and joint mobilization  Plan of Care beginning date: 7/11/2022  Plan of Care expiration date: 7/11/2022  Treatment plan discussed with: patient        Subjective Evaluation    History of Present Illness  Date of onset: 2/1/2022  Mechanism of injury: RE-EVALUATION 7/11/22: Patient reports that she was still sore following last session but not as bad as before  She went for massage last Friday, with extra work on her hips  She reports no pain in her knee right now and isn't taking any ibprophoen  She tried taping herself over the weekend and it worked well  She didn't have any knee pain  This past weekend she was able to walk on uneven ground without pain and 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  She has tightness with stair climbing but not as much pain recently  Overall, her pain is better but she has had several flare ups throughout course of care  Patient has evaluation for R knee pain and swelling  Patient reports onset several months ago  No specific aggravating incident   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  She had gradual worsening of pain, with swelling noted 3 weeks ago  Symptoms: R knee pain, swelling (especially at end of day)  Pain increases with bending knee  Difficulty walking, stair climbing, getting up from toilet, woken almost every night  She gets occasional buckling, which is reduced with wearing brace, but brace increases pain  She is unable to kneel or squat  Some pain lower lateral leg    Can get posterior thigh and low back pain  Denies paraesthesias in LE       PMH: L IT band issues  R hip pain, intermittent LBP, right posterior thigh and lower leg pain  Pain  Current pain ratin  At best pain ratin  At worst pain ratin    Patient Goals  Patient goals for therapy: decreased pain and increased motion          Objective     Tenderness     Additional Tenderness Details    Mild to no tenderness along IT band b/l    Active Range of Motion   Left Knee   Hyperextension  Flexion: 136 degrees   Extension: 2 degrees     Right Knee   Flexion: 134 degrees   Extension: -2 degrees     Passive Range of Motion   Left Knee   Flexion: 141 degrees     Right Knee   Flexion: 139 degrees with pain  Extension: 0 degrees     Additional Passive Range of Motion Details  Moderate quad tightness on R  Mild quad tightness on L  Hamstrings WNL  Calf and IT band WNL    Strength/Myotome Testing     Left Hip   Planes of Motion   Flexion: 5  Extension: 5  Abduction: 5  Adduction: 4+    Right Hip   Planes of Motion   Flexion: 5  Extension: 4+  Abduction: 5  Adduction: 5    Left Knee   Flexion: 5  Extension: 5    Right Knee   Flexion: 5 (pain)  Extension: 5    Tests     Left Knee   Negative Thessaly's test at 5 degrees  Right Knee   Negative Thessaly's test at 5 degrees, valgus stress test at 0 degrees and varus stress test at 0 degrees       Additional Tests Details  SLS: 30 seconds on R EO, 30 L, 13 on R EC, 13 on L    Squat: anterior position of knees over toes, but improved compared to start of care    Swelling     Left Knee Girth Measurement (cm)   10 cm above joint line: 50 5 cm  10 cm below joint line: 44 5 cm    Right Knee Girth Measurement (cm)   10 cm above joint line: 53 cm  10 cm below joint line: 45 5 cm             Precautions: EPOC 7/8/22    Manuals 6/8 6/16 6/21 6/23 6/27 7/6 7/11      A-P mobs tib femoral jt gr 3-4 MIKEY   MIKEY         Knee ext MWM             STM quad MIKEY MIKEY           IASTM IT band MIKEY MIKEY           Neuro Re-Ed             Kt tape to improve propioception   8' 8' 8' 8'       SLS  3x30" foam           steamboats   10 ea foam 10 ea foam 10x ea foam        Lateral walks 2x20ft gtb 2x20ft bktb 3x20ft bltb 3x20ft bltb 3x20'  Blue TB                     Ther Ex             Heel slides       5x10"      Elliptical              bike 8' lvl 1-2 8' lvl 1-2 8' lvl 1-2 8' lvl 1-2 8'  L1 5' lvl 1       Pt edu on plan of care, pathology, home program 5' HEP modifications     Self administerring tape and contacting PCP 10'      Standing quad stretch  3x15"           Knee ext stretch       5x10"      Assessment associated with re-eval       30'      Prone quad stretch 5x15" 5x15" 3x15" 3x15" 3x15" 3x15"       SLR abd             Wall sit nv            Knee ext isometric      3x30"       Single leg press  45-85# gtb pull 3x10   105# 3x10 105# 3x10        SLR             clamshells  3x10 bktb  3x10 bktb 3x10 blk TB        Bridges with abd  3x10 bktb  3x10 bktb 3x10 blk TB        gastroc stretch             Piriformis stretch 3x30"            Ther Activity             Lateral slider             Posterior slider             Lateral step ups             Wall squats 10x gtb  3x10 gtb 3x10 gtb         Gait Training                                       Modalities             Cold pack

## 2022-07-13 ENCOUNTER — APPOINTMENT (OUTPATIENT)
Dept: PHYSICAL THERAPY | Facility: CLINIC | Age: 52
End: 2022-07-13
Payer: COMMERCIAL

## 2022-07-18 ENCOUNTER — APPOINTMENT (OUTPATIENT)
Dept: PHYSICAL THERAPY | Facility: CLINIC | Age: 52
End: 2022-07-18
Payer: COMMERCIAL

## 2022-07-25 ENCOUNTER — APPOINTMENT (OUTPATIENT)
Dept: PHYSICAL THERAPY | Facility: CLINIC | Age: 52
End: 2022-07-25
Payer: COMMERCIAL

## 2022-07-27 ENCOUNTER — APPOINTMENT (OUTPATIENT)
Dept: PHYSICAL THERAPY | Facility: CLINIC | Age: 52
End: 2022-07-27
Payer: COMMERCIAL

## 2022-09-23 ENCOUNTER — TELEPHONE (OUTPATIENT)
Dept: FAMILY MEDICINE CLINIC | Facility: CLINIC | Age: 52
End: 2022-09-23

## 2022-09-23 ENCOUNTER — OFFICE VISIT (OUTPATIENT)
Dept: FAMILY MEDICINE CLINIC | Facility: CLINIC | Age: 52
End: 2022-09-23
Payer: COMMERCIAL

## 2022-09-23 VITALS
WEIGHT: 210.6 LBS | DIASTOLIC BLOOD PRESSURE: 78 MMHG | TEMPERATURE: 98.3 F | SYSTOLIC BLOOD PRESSURE: 124 MMHG | HEART RATE: 80 BPM | RESPIRATION RATE: 18 BRPM | BODY MASS INDEX: 32.74 KG/M2

## 2022-09-23 DIAGNOSIS — R39.9 URINARY SYMPTOM OR SIGN: Primary | ICD-10-CM

## 2022-09-23 DIAGNOSIS — Z12.31 ENCOUNTER FOR SCREENING MAMMOGRAM FOR MALIGNANT NEOPLASM OF BREAST: ICD-10-CM

## 2022-09-23 LAB
SL AMB  POCT GLUCOSE, UA: NEGATIVE
SL AMB LEUKOCYTE ESTERASE,UA: ABNORMAL
SL AMB POCT BILIRUBIN,UA: NEGATIVE
SL AMB POCT BLOOD,UA: ABNORMAL
SL AMB POCT CLARITY,UA: CLEAR
SL AMB POCT COLOR,UA: YELLOW
SL AMB POCT KETONES,UA: NEGATIVE
SL AMB POCT NITRITE,UA: NEGATIVE
SL AMB POCT PH,UA: 8
SL AMB POCT SPECIFIC GRAVITY,UA: 1
SL AMB POCT URINE PROTEIN: NEGATIVE
SL AMB POCT UROBILINOGEN: 0.2

## 2022-09-23 PROCEDURE — 99214 OFFICE O/P EST MOD 30 MIN: CPT | Performed by: NURSE PRACTITIONER

## 2022-09-23 PROCEDURE — 81002 URINALYSIS NONAUTO W/O SCOPE: CPT | Performed by: NURSE PRACTITIONER

## 2022-09-23 RX ORDER — CIPROFLOXACIN 500 MG/1
500 TABLET, FILM COATED ORAL EVERY 12 HOURS SCHEDULED
Qty: 14 TABLET | Refills: 0 | Status: SHIPPED | OUTPATIENT
Start: 2022-09-23 | End: 2022-09-30

## 2022-09-23 NOTE — TELEPHONE ENCOUNTER
Patient called to check to see if you had sent the script in to the [facility] [address] in her chart for the Cipro

## 2022-09-23 NOTE — PROGRESS NOTES
Assessment/Plan:     Diagnoses and all orders for this visit:    Urinary symptom or sign  -     POCT urine dip  -     UA (URINE) with reflex to Scope  -     Urine culture  -     ciprofloxacin (CIPRO) 500 mg tablet; Take 1 tablet (500 mg total) by mouth every 12 (twelve) hours for 7 days    Pt to take antibiotic as prescribed  Patient encouraged to increase oral fluid intake of water to help flush system  They may utilize OTC Azo or Uristat to help with urinary symptom relief  Incorporating cranberry supplementation can also help aid in treatment plan  Pt to call office if they develop any fevers, chills, CVA tenderness, or any worsening of symptoms  Urine culture sent  Pt notified that we will contact them if any alterations or changes need to be made to treatment plan based on urine culture final results  Encounter for screening mammogram for malignant neoplasm of breast  -     Mammo screening bilateral w 3d & cad; Future          Pt to f/u PRN  Subjective:      Patient ID: Judie Gregorio is a 46 y o  female  Patient presents for urinary urgency, slight lower back pain, slight burning with urination  She notes occasional urinary leakage as well  Denies NVD, cva tenderness, fever, chills  She believes she may have a UTI  The following portions of the patient's history were reviewed and updated as appropriate: allergies, current medications, past family history, past medical history, past social history, past surgical history and problem list     Review of Systems    As noted per HPI  Objective:      /78   Pulse 80   Temp 98 3 °F (36 8 °C) (Oral)   Resp 18   Wt 95 5 kg (210 lb 9 6 oz)   BMI 32 74 kg/m²          Physical Exam  Vitals reviewed  Constitutional:       Appearance: Normal appearance  Cardiovascular:      Rate and Rhythm: Normal rate and regular rhythm  Pulses: Normal pulses  Heart sounds: Normal heart sounds     Pulmonary:      Effort: Pulmonary effort is normal  No respiratory distress  Breath sounds: Normal breath sounds  No wheezing  Abdominal:      General: There is no distension  Palpations: There is no mass  Tenderness: There is no abdominal tenderness  There is no right CVA tenderness, left CVA tenderness, guarding or rebound  Hernia: No hernia is present  Neurological:      Mental Status: She is alert and oriented to person, place, and time  Mental status is at baseline  Psychiatric:         Mood and Affect: Mood normal          Behavior: Behavior normal          Thought Content:  Thought content normal          Judgment: Judgment normal

## 2022-09-28 LAB
APPEARANCE UR: CLEAR
BACTERIA UR CULT: ABNORMAL
BACTERIA URNS QL MICRO: NORMAL
BILIRUB UR QL STRIP: NEGATIVE
CASTS URNS QL MICRO: NORMAL /LPF
COLOR UR: YELLOW
EPI CELLS #/AREA URNS HPF: NORMAL /HPF (ref 0–10)
GLUCOSE UR QL: NEGATIVE
HGB UR QL STRIP: NEGATIVE
KETONES UR QL STRIP: NEGATIVE
LEUKOCYTE ESTERASE UR QL STRIP: ABNORMAL
Lab: ABNORMAL
MICRO URNS: ABNORMAL
NITRITE UR QL STRIP: NEGATIVE
PH UR STRIP: 7.5 [PH] (ref 5–7.5)
PROT UR QL STRIP: NEGATIVE
RBC #/AREA URNS HPF: NORMAL /HPF (ref 0–2)
SL AMB ANTIMICROBIAL SUSCEPTIBILITY: ABNORMAL
SP GR UR: 1 (ref 1–1.03)
UROBILINOGEN UR STRIP-ACNC: 0.2 MG/DL (ref 0.2–1)
WBC #/AREA URNS HPF: NORMAL /HPF (ref 0–5)

## 2022-10-31 NOTE — H&P
History and Physical -  [facility]  Norris Arrington 44 y o  female MRN: [id number]    HPI: Norris Arrington is a 44y o  year old female who presents for screening colonoscopy, high risk family history of colon cancer    REVIEW OF SYSTEMS: Per the HPI, and otherwise unremarkable  Historical Information   History reviewed  No pertinent past medical history  Past Surgical History:   Procedure Laterality Date    COLONOSCOPY      DILATION AND CURETTAGE OF UTERUS       Social History   Social History     Substance and Sexual Activity   Alcohol Use No     Social History     Substance and Sexual Activity   Drug Use No     Social History     Tobacco Use   Smoking Status Never Smoker   Smokeless Tobacco Never Used     Family History   Problem Relation Age of Onset    Colon cancer Mother     Depression Mother     Substance Abuse Father     Mental illness Neg Hx        Meds/Allergies       Current Outpatient Medications:     Sod Picosulfate-Mag Ox-Cit Acd (Clenpiq) 10-3 5-12 MG-GM -GM/160ML SOLN    famotidine (PEPCID) 20 mg tablet    Current Facility-Administered Medications:     lactated ringers infusion, 50 mL/hr, Intravenous, Continuous, 50 mL/hr at 08/13/21 2008    Allergies   Allergen Reactions    Penicillins      Pt is unaware what reaction       Objective     /86   Pulse 70   Temp 98 °F (36 7 °C) (Temporal)   Resp 18   SpO2 99%     PHYSICAL EXAM    Gen: NAD AAOx3  Head: Normocephalic, Atraumatic  CV: S1S2 RRR no m/r/g  CHEST: Clear b/l no c/r/w  ABD: soft, +BS NT/ND  EXT: no edema    ASSESSMENT/PLAN:  This is a 44y o  year old female here for colonoscopy, and she is stable and optimized for her procedure  Jordan Hyatt

## 2023-06-30 ENCOUNTER — NURSE TRIAGE (OUTPATIENT)
Age: 53
End: 2023-06-30

## 2023-06-30 ENCOUNTER — OFFICE VISIT (OUTPATIENT)
Dept: FAMILY MEDICINE CLINIC | Facility: CLINIC | Age: 53
End: 2023-06-30
Payer: COMMERCIAL

## 2023-06-30 VITALS
DIASTOLIC BLOOD PRESSURE: 86 MMHG | RESPIRATION RATE: 16 BRPM | HEIGHT: 68 IN | TEMPERATURE: 98.4 F | WEIGHT: 221.4 LBS | OXYGEN SATURATION: 97 % | SYSTOLIC BLOOD PRESSURE: 136 MMHG | HEART RATE: 81 BPM | BODY MASS INDEX: 33.56 KG/M2

## 2023-06-30 DIAGNOSIS — R39.15 URINARY URGENCY: ICD-10-CM

## 2023-06-30 DIAGNOSIS — Z13.228 SCREENING FOR METABOLIC DISORDER: ICD-10-CM

## 2023-06-30 DIAGNOSIS — Z13.6 SCREENING FOR CARDIOVASCULAR CONDITION: ICD-10-CM

## 2023-06-30 DIAGNOSIS — Z13.29 SCREENING FOR THYROID DISORDER: ICD-10-CM

## 2023-06-30 DIAGNOSIS — E55.9 VITAMIN D DEFICIENCY: ICD-10-CM

## 2023-06-30 DIAGNOSIS — K62.5 BRIGHT RED RECTAL BLEEDING: Primary | ICD-10-CM

## 2023-06-30 DIAGNOSIS — Z13.1 SCREENING FOR DIABETES MELLITUS: ICD-10-CM

## 2023-06-30 LAB
SL AMB  POCT GLUCOSE, UA: ABNORMAL
SL AMB LEUKOCYTE ESTERASE,UA: ABNORMAL
SL AMB POCT BILIRUBIN,UA: ABNORMAL
SL AMB POCT BLOOD,UA: ABNORMAL
SL AMB POCT CLARITY,UA: CLEAR
SL AMB POCT COLOR,UA: YELLOW
SL AMB POCT KETONES,UA: ABNORMAL
SL AMB POCT NITRITE,UA: ABNORMAL
SL AMB POCT PH,UA: 5
SL AMB POCT SPECIFIC GRAVITY,UA: 1
SL AMB POCT URINE PROTEIN: ABNORMAL
SL AMB POCT UROBILINOGEN: 0.2

## 2023-06-30 NOTE — PROGRESS NOTES
Assessment/Plan:     Diagnoses and all orders for this visit:    Bright red rectal bleeding  -     Ambulatory Referral to Gastroenterology; Future    Referral to GI placed for evaluation  Check CBC now  Pt encouraged to continue to keep well hydrated and take her multivitamin  She is to contact our office if she has any difficulty getting an appointment w/ GI  Patient is encouraged to call our office for any questions/concerns, persistent or worsening symptoms  Patient states they understand and agree with treatment plan  Urinary urgency  -     POCT urine dip  -     Urine culture  -     UA (URINE) with reflex to Scope    Urine dip showing some blood  Pt has hx of hematuria that was previously worked up  Send for culture  Pt encouraged to keep well hydrated  Patient is encouraged to call our office for any questions/concerns, persistent or worsening symptoms  Patient states they understand and agree with treatment plan  Screening for cardiovascular condition  -     CBC and differential; Future    Screening for metabolic disorder  -     Comprehensive metabolic panel; Future    Screening for thyroid disorder  -     TSH, 3rd generation with Free T4 reflex; Future    Screening for diabetes mellitus  -     Hemoglobin A1C; Future    Vitamin D deficiency  -     Vitamin D 25 hydroxy; Future            Pt to f/u PRN  Subjective:      Patient ID: Yamileth Gordon is a 45 y o  female  Pt here today for concerns over 2 episodes of bright red blood during BM  She notes that the blood appears to be around the stool and not mixed in  Pt denies any dark tarry stools  Pt denies fever, chills, body aches, abdominal cramping, NVD  Pt had a colonoscopy 2 years ago which showed internal hemorrhoids  Pt denies any external hemorrhoids  Pt has family hx of colon cancer in her mom  Pt also notes some increased urinary frequency and urgency          The following portions of the patient's history were reviewed and updated "as appropriate: allergies, current medications, past family history, past medical history, past social history, past surgical history and problem list     Review of Systems    As noted per HPI  Objective:      /86   Pulse 81   Temp 98 4 °F (36 9 °C)   Resp 16   Ht 5' 8\" (1 727 m)   Wt 100 kg (221 lb 6 4 oz)   SpO2 97%   BMI 33 66 kg/m²          Physical Exam  Vitals reviewed  Constitutional:       Appearance: Normal appearance  Cardiovascular:      Rate and Rhythm: Normal rate and regular rhythm  Pulses: Normal pulses  Heart sounds: Normal heart sounds  Pulmonary:      Effort: Pulmonary effort is normal       Breath sounds: Normal breath sounds  Abdominal:      General: There is no distension  Palpations: Abdomen is soft  There is no mass  Tenderness: There is no abdominal tenderness  There is no guarding or rebound  Hernia: No hernia is present  Neurological:      Mental Status: She is alert and oriented to person, place, and time  Mental status is at baseline  Psychiatric:         Mood and Affect: Mood normal          Behavior: Behavior normal          Thought Content:  Thought content normal          Judgment: Judgment normal          "

## 2023-06-30 NOTE — TELEPHONE ENCOUNTER
"Patient was ordered a GI consult by her PCP for blood in her stool and has an appointment scheduled for 8/17/23 at 1330  She is going to the lab today to have blood work done that was ordered by her PCP and would like to know if there is anything else she can do until her appointment  She is also going away tomorrow  Patient was advised of what symptoms to look for in order to go to the [facility] [address]  She would like to know if there is any way she can be seen sooner and would like a call back to advise  Reason for Disposition  • MILD rectal bleeding (more than just a few drops or streaks)    Answer Assessment - Initial Assessment Questions  1  APPEARANCE of BLOOD: \"What color is it? \" \"Is it passed separately, on the surface of the stool, or mixed in with the stool? \"       Bright red   2  AMOUNT: \"How much blood was passed? \"       Surface of the stool   3  FREQUENCY: \"How many times has blood been passed with the stools? \"       2  4  ONSET: \"When was the blood first seen in the stools? \" (Days or weeks)       6/28/23  5  DIARRHEA: \"Is there also some diarrhea? \" If Yes, ask: \"How many diarrhea stools were passed in past 24 hours? \"       Denies   6  CONSTIPATION: \"Do you have constipation? \" If Yes, ask: \"How bad is it? \"      Denies   7  RECURRENT SYMPTOMS: \"Have you had blood in your stools before? \" If Yes, ask: \"When was the last time? \" and \"What happened that time? \"       Denies   8  BLOOD THINNERS: \"Do you take any blood thinners? \" (e g , Coumadin/warfarin, Pradaxa/dabigatran, aspirin)      Denies   9  OTHER SYMPTOMS: \"Do you have any other symptoms? \"  (e g , abdominal pain, vomiting, dizziness, fever)      Abdominal pain (nerves)   10  PREGNANCY: \"Is there any chance you are pregnant? \" \"When was your last menstrual period? \"        N/A    Protocols used: RECTAL BLEEDING-ADULT-[place]      "

## 2023-06-30 NOTE — TELEPHONE ENCOUNTER
Left message with patient that if she wanted a sooner appointment we could see her in our [address] offices or [address] office sooner  No availability otherwise

## 2023-07-01 LAB
25(OH)D3+25(OH)D2 SERPL-MCNC: 21.1 NG/ML (ref 30–100)
ALBUMIN SERPL-MCNC: 4.4 G/DL (ref 3.8–4.9)
ALBUMIN/GLOB SERPL: 2.3 {RATIO} (ref 1.2–2.2)
ALP SERPL-CCNC: 67 IU/L (ref 44–121)
ALT SERPL-CCNC: 12 IU/L (ref 0–32)
AST SERPL-CCNC: 16 IU/L (ref 0–40)
BASOPHILS # BLD AUTO: 0 X10E3/UL (ref 0–0.2)
BASOPHILS NFR BLD AUTO: 1 %
BILIRUB SERPL-MCNC: 0.7 MG/DL (ref 0–1.2)
BUN SERPL-MCNC: 16 MG/DL (ref 6–24)
BUN/CREAT SERPL: 14 (ref 9–23)
CALCIUM SERPL-MCNC: 9.3 MG/DL (ref 8.7–10.2)
CHLORIDE SERPL-SCNC: 106 MMOL/L (ref 96–106)
CO2 SERPL-SCNC: 25 MMOL/L (ref 20–29)
CREAT SERPL-MCNC: 1.13 MG/DL (ref 0.57–1)
EGFR: 58 ML/MIN/1.73
EOSINOPHIL # BLD AUTO: 0.3 X10E3/UL (ref 0–0.4)
EOSINOPHIL NFR BLD AUTO: 6 %
ERYTHROCYTE [DISTWIDTH] IN BLOOD BY AUTOMATED COUNT: 12 % (ref 11.7–15.4)
EST. AVERAGE GLUCOSE BLD GHB EST-MCNC: 103 MG/DL
GLOBULIN SER-MCNC: 1.9 G/DL (ref 1.5–4.5)
GLUCOSE SERPL-MCNC: 92 MG/DL (ref 70–99)
HBA1C MFR BLD: 5.2 % (ref 4.8–5.6)
HCT VFR BLD AUTO: 44.2 % (ref 34–46.6)
HGB BLD-MCNC: 14.8 G/DL (ref 11.1–15.9)
IMM GRANULOCYTES # BLD: 0 X10E3/UL (ref 0–0.1)
IMM GRANULOCYTES NFR BLD: 0 %
LYMPHOCYTES # BLD AUTO: 1.3 X10E3/UL (ref 0.7–3.1)
LYMPHOCYTES NFR BLD AUTO: 29 %
MCH RBC QN AUTO: 31.2 PG (ref 26.6–33)
MCHC RBC AUTO-ENTMCNC: 33.5 G/DL (ref 31.5–35.7)
MCV RBC AUTO: 93 FL (ref 79–97)
MONOCYTES # BLD AUTO: 0.6 X10E3/UL (ref 0.1–0.9)
MONOCYTES NFR BLD AUTO: 13 %
NEUTROPHILS # BLD AUTO: 2.3 X10E3/UL (ref 1.4–7)
NEUTROPHILS NFR BLD AUTO: 51 %
PLATELET # BLD AUTO: 182 X10E3/UL (ref 150–450)
POTASSIUM SERPL-SCNC: 4.6 MMOL/L (ref 3.5–5.2)
PROT SERPL-MCNC: 6.3 G/DL (ref 6–8.5)
RBC # BLD AUTO: 4.74 X10E6/UL (ref 3.77–5.28)
SODIUM SERPL-SCNC: 142 MMOL/L (ref 134–144)
TSH SERPL DL<=0.005 MIU/L-ACNC: 0.68 UIU/ML (ref 0.45–4.5)
WBC # BLD AUTO: 4.5 X10E3/UL (ref 3.4–10.8)

## 2023-07-02 LAB
APPEARANCE UR: CLEAR
BACTERIA UR CULT: NORMAL
BILIRUB UR QL STRIP: NEGATIVE
COLOR UR: YELLOW
GLUCOSE UR QL: NEGATIVE
HGB UR QL STRIP: NEGATIVE
KETONES UR QL STRIP: NEGATIVE
LEUKOCYTE ESTERASE UR QL STRIP: NEGATIVE
Lab: NO GROWTH
MICRO URNS: NORMAL
NITRITE UR QL STRIP: NEGATIVE
PH UR STRIP: 6.5 [PH] (ref 5–7.5)
PROT UR QL STRIP: NEGATIVE
SP GR UR: 1.01 (ref 1–1.03)
UROBILINOGEN UR STRIP-ACNC: 0.2 MG/DL (ref 0.2–1)

## 2023-07-03 NOTE — TELEPHONE ENCOUNTER
I called the patient and offered her an appointment for next week in the [address] [place] [address] office in [facility] to move her follow up appointment up.

## 2023-07-06 DIAGNOSIS — R79.89 ELEVATED SERUM CREATININE: Primary | ICD-10-CM

## 2023-08-17 ENCOUNTER — TELEPHONE (OUTPATIENT)
Dept: GASTROENTEROLOGY | Facility: CLINIC | Age: 53
End: 2023-08-17

## 2023-08-17 ENCOUNTER — CONSULT (OUTPATIENT)
Dept: GASTROENTEROLOGY | Facility: CLINIC | Age: 53
End: 2023-08-17
Payer: COMMERCIAL

## 2023-08-17 VITALS
HEIGHT: 68 IN | SYSTOLIC BLOOD PRESSURE: 122 MMHG | WEIGHT: 220 LBS | BODY MASS INDEX: 33.34 KG/M2 | DIASTOLIC BLOOD PRESSURE: 74 MMHG | TEMPERATURE: 97.6 F

## 2023-08-17 DIAGNOSIS — K62.5 BRIGHT RED RECTAL BLEEDING: ICD-10-CM

## 2023-08-17 DIAGNOSIS — K21.9 GASTROESOPHAGEAL REFLUX DISEASE, UNSPECIFIED WHETHER ESOPHAGITIS PRESENT: Primary | ICD-10-CM

## 2023-08-17 PROCEDURE — 99204 OFFICE O/P NEW MOD 45 MIN: CPT | Performed by: PHYSICIAN ASSISTANT

## 2023-08-17 RX ORDER — SODIUM PICOSULFATE, MAGNESIUM OXIDE, AND ANHYDROUS CITRIC ACID 12; 3.5; 1 G/175ML; G/175ML; MG/175ML
LIQUID ORAL
Qty: 350 ML | Refills: 0 | Status: SHIPPED | OUTPATIENT
Start: 2023-08-17

## 2023-08-17 NOTE — PATIENT INSTRUCTIONS
Scheduled date of colonoscopy/egd (as of today):10/30/2023  Physician performing colonoscopy/egd:Dr Pita Ba  Location of colonoscopy/egd:[facility]  Bowel prep reviewed with patient:Clenpiq  Instructions reviewed with patient by:Vee  Clearances:  n/a

## 2023-08-17 NOTE — PROGRESS NOTES
[facility] - Outpatient Consultation  Seamus Martinez 45 y.o. female MRN: [id number]  Encounter: [id number]          ASSESSMENT AND PLAN:      Moreno Bauer is a 46 y/o female who presents for consultation of hematochezia. 1. BRB MT  2. Family hx of colon cancer  Hgb WNL. Pt says she had a few episodes of BRB "on top" of her stool recently, which never occurred prior. She says the amount of bleeding was "a lot." she says this worried her as her mother was dx with stage 4 colon cancer. Last colonoscopy was in 2021, which did note internal hemorrhoids. She says she would like colonoscope done. -explained to pt that this does certainly sound hemorrhoidal in nature but due to her family hx, I am ok with getting a colonoscopy done especially as the pt would like this done   -colonoscopy ordered; instructions given; risks and benefits revieed    3. Heartburn  Pt abruptly started to feel heartburn this past weekend and has been needing to take OTC omeprazole daily since. She says she hasn't felt it in the last couple of days. She says she would be ok with an EGD  -I recommend stopping omeprazole to see if she really needs to be on this; I asked her to call us if it returns so I can send in omeprazole and EGD would certainly be warranted   ______________________________________________________________________    HPI:  Moreno Bauer is a 46 y/o female who presents for consultation of hematochezia. Pt says that about 1 month ago, she had a few episodes of BRB "on top" of her stool. She says that her mother was dx with stage 4 colon cancer, so this worried her. She says there was a lot of blood and it filled the toilet. Pt denies constipation, diarrhea, unintentional weight loss, fevers, chills, abdominal pain. N/v, black BMs. Pt says she had severe heartburn this past weekend, which is new, so she started taking omeprazole daily since and has not had any heartburn the last few days.  She denies frequent NSAID use and is not on blood thinners. REVIEW OF SYSTEMS:    CONSTITUTIONAL: Denies any fever, chills, rigors, and weight loss. HEENT: No earache or tinnitus. Denies hearing loss or visual disturbances. CARDIOVASCULAR: No chest pain or palpitations. RESPIRATORY: Denies any cough, hemoptysis, shortness of breath or dyspnea on exertion. GASTROINTESTINAL: As noted in the History of Present Illness. GENITOURINARY: No problems with urination. Denies any hematuria or dysuria. NEUROLOGIC: No dizziness or vertigo, denies headaches. MUSCULOSKELETAL: Denies any muscle or joint pain. SKIN: Denies skin rashes or itching. ENDOCRINE: Denies excessive thirst. Denies intolerance to heat or cold. PSYCHOSOCIAL: Denies depression or anxiety. Denies any recent memory loss. Historical Information   No past medical history on file. Past Surgical History:   Procedure Laterality Date   • COLONOSCOPY     • CYST REMOVAL Right     middle finger on hand   • DILATION AND CURETTAGE OF UTERUS       Social History   Social History     Substance and Sexual Activity   Alcohol Use No     Social History     Substance and Sexual Activity   Drug Use No     Social History     Tobacco Use   Smoking Status Never   Smokeless Tobacco Never     Family History   Problem Relation Age of Onset   • Colon cancer Mother    • Depression Mother    • Substance Abuse Father    • Leukemia Maternal Grandmother    • Mental illness Neg Hx        Meds/Allergies       Current Outpatient Medications:   •  famotidine (PEPCID) 20 mg tablet  •  fexofenadine (ALLEGRA) 180 MG tablet  •  loratadine (CLARITIN) 10 mg tablet  •  multivitamin (THERAGRAN) TABS    Allergies   Allergen Reactions   • Penicillins      Pt is unaware what reaction   • Seasonal Ic [Octacosanol] Allergic Rhinitis           Objective     There were no vitals taken for this visit. There is no height or weight on file to calculate BMI.         PHYSICAL EXAM:      General Appearance:   Alert, cooperative, no distress   HEENT:   Normocephalic, atraumatic, anicteric.     Neck:  Supple, symmetrical, trachea midline   Lungs:   Clear to auscultation bilaterally; no rales, rhonchi or wheezing; respirations unlabored    Heart[de-identified]   Regular rate and rhythm; no murmur, rub, or gallop. Abdomen:   Soft, non-tender, non-distended; normal bowel sounds; no masses, no organomegaly    Genitalia:   Deferred    Rectal:   Deferred    Extremities:  No cyanosis, clubbing or edema    Pulses:  2+ and symmetric    Skin:  No jaundice, rashes, or lesions    Lymph nodes:  No palpable cervical lymphadenopathy        Lab Results:   No visits with results within 1 Day(s) from this visit.    Latest known visit with results is:   Office Visit on 06/30/2023   Component Date Value   • White Blood Cell Count 06/30/2023 4.5    • Red Blood Cell Count 06/30/2023 4.74    • Hemoglobin 06/30/2023 14.8    • HCT 06/30/2023 44.2    • MCV 06/30/2023 93    • MCH 06/30/2023 31.2    • MCHC 06/30/2023 33.5    • RDW 06/30/2023 12.0    • Platelet Count 30/69/5304 182    • Neutrophils 06/30/2023 51    • Lymphocytes 06/30/2023 29    • Monocytes 06/30/2023 13    • Eosinophils 06/30/2023 6    • Basophils PCT 06/30/2023 1    • Neutrophils (Absolute) 06/30/2023 2.3    • Lymphocytes (Absolute) 06/30/2023 1.3    • Monocytes (Absolute) 06/30/2023 0.6    • Eosinophils (Absolute) 06/30/2023 0.3    • Basophils ABS 06/30/2023 0.0    • Immature Granulocytes 06/30/2023 0    • Immature Granulocytes (A* 06/30/2023 0.0    • Glucose, Random 06/30/2023 92    • BUN 06/30/2023 16    • Creatinine 06/30/2023 1.13 (H)    • eGFR 06/30/2023 58 (L)    • SL AMB BUN/CREATININE RA* 06/30/2023 14    • Sodium 06/30/2023 142    • Potassium 06/30/2023 4.6    • Chloride 06/30/2023 106    • CO2 06/30/2023 25    • CALCIUM 06/30/2023 9.3    • Protein, Total 06/30/2023 6.3    • Albumin 06/30/2023 4.4    • Globulin, Total 06/30/2023 1.9    • Albumin/Globulin Ratio 06/30/2023 2.3 (H)    • TOTAL BILIRUBIN 06/30/2023 0.7    • Alk Phos Isoenzymes 06/30/2023 67    • AST 06/30/2023 16    • ALT 06/30/2023 12    • TSH 06/30/2023 0.675    • Hemoglobin A1C 06/30/2023 5.2    • Estimated Average Glucose 06/30/2023 103    • 25-HYDROXY VIT D 06/30/2023 21.1 (L)    • LEUKOCYTE ESTERASE,UA 06/30/2023 neg    • NITRITE,UA 06/30/2023 neg    • SL AMB POCT UROBILINOGEN 06/30/2023 0.2    • POCT URINE PROTEIN 06/30/2023 neg    •  PH,UA 06/30/2023 5.0    • BLOOD,UA 06/30/2023 mod    • SPECIFIC GRAVITY,UA 06/30/2023 1.005    • KETONES,UA 06/30/2023 neg    • BILIRUBIN,UA 06/30/2023 neg    • GLUCOSE, UA 06/30/2023 neg    •  COLOR,UA 06/30/2023 Yellow    • CLARITY,UA 06/30/2023 Clear    • Urine Culture Result 06/30/2023 Final report    • Specific Gravity 06/30/2023 1.010    • Ph 06/30/2023 6.5    • Color UA 06/30/2023 Yellow    • Urine Appearance 06/30/2023 Clear    • Leukocyte Esterase 06/30/2023 Negative    • Protein 06/30/2023 Negative    • Glucose, 24 HR Urine 06/30/2023 Negative    • Ketone, Urine 06/30/2023 Negative    • Blood, Urine 06/30/2023 Negative    • Bilirubin, Urine 06/30/2023 Negative    • Urobilinogen Urine 06/30/2023 0.2    • Nitrites Urine 06/30/2023 Negative    • Microscopic Examination 06/30/2023 Comment    • Result 1 06/30/2023 No growth          Radiology Results:   No results found.

## 2023-10-16 ENCOUNTER — ANESTHESIA (OUTPATIENT)
Dept: ANESTHESIOLOGY | Facility: AMBULATORY SURGERY CENTER | Age: 53
End: 2023-10-16

## 2023-10-16 ENCOUNTER — ANESTHESIA EVENT (OUTPATIENT)
Dept: ANESTHESIOLOGY | Facility: AMBULATORY SURGERY CENTER | Age: 53
End: 2023-10-16

## 2023-10-16 ENCOUNTER — TELEPHONE (OUTPATIENT)
Dept: GASTROENTEROLOGY | Facility: CLINIC | Age: 53
End: 2023-10-16

## 2023-10-16 NOTE — TELEPHONE ENCOUNTER
Procedure confirmed  Colonoscopy/Endoscopy     Via: Spoke with patient. Instructions given: Given to Patient at Visit     Prep Given: Clenpiq    Call the office if there are any questions.

## 2023-10-23 ENCOUNTER — HOSPITAL ENCOUNTER (EMERGENCY)
Facility: HOSPITAL | Age: 53
Discharge: HOME | End: 2023-10-24
Attending: EMERGENCY MEDICINE
Payer: COMMERCIAL

## 2023-10-23 ENCOUNTER — APPOINTMENT (EMERGENCY)
Dept: CT IMAGING | Facility: HOSPITAL | Age: 53
End: 2023-10-23
Payer: COMMERCIAL

## 2023-10-23 ENCOUNTER — APPOINTMENT (OUTPATIENT)
Dept: RADIOLOGY | Facility: HOSPITAL | Age: 53
End: 2023-10-23
Payer: COMMERCIAL

## 2023-10-23 DIAGNOSIS — I10 HIGH BLOOD PRESSURE: ICD-10-CM

## 2023-10-23 DIAGNOSIS — K29.80 DUODENITIS: Primary | ICD-10-CM

## 2023-10-23 LAB
ALBUMIN SERPL BCP-MCNC: 4.4 G/DL (ref 3.5–5)
ALP SERPL-CCNC: 62 U/L (ref 34–104)
ALT SERPL W P-5'-P-CCNC: 12 U/L (ref 7–52)
AMORPH URATE CRY URNS QL MICRO: NORMAL
ANION GAP SERPL CALCULATED.3IONS-SCNC: 7 MMOL/L
AST SERPL W P-5'-P-CCNC: 14 U/L (ref 13–39)
BACTERIA UR QL AUTO: NORMAL /HPF
BASOPHILS # BLD AUTO: 0.04 THOUSANDS/ÂΜL (ref 0–0.1)
BASOPHILS NFR BLD AUTO: 1 % (ref 0–1)
BILIRUB SERPL-MCNC: 0.67 MG/DL (ref 0.2–1)
BILIRUB UR QL STRIP: NEGATIVE
BUN SERPL-MCNC: 20 MG/DL (ref 5–25)
CALCIUM SERPL-MCNC: 9.7 MG/DL (ref 8.4–10.2)
CARDIAC TROPONIN I PNL SERPL HS: <2 NG/L
CHLORIDE SERPL-SCNC: 105 MMOL/L (ref 96–108)
CLARITY UR: CLEAR
CO2 SERPL-SCNC: 26 MMOL/L (ref 21–32)
COLOR UR: YELLOW
CREAT SERPL-MCNC: 1.16 MG/DL (ref 0.6–1.3)
EOSINOPHIL # BLD AUTO: 0.18 THOUSAND/ÂΜL (ref 0–0.61)
EOSINOPHIL NFR BLD AUTO: 3 % (ref 0–6)
ERYTHROCYTE [DISTWIDTH] IN BLOOD BY AUTOMATED COUNT: 12.2 % (ref 11.6–15.1)
GFR SERPL CREATININE-BSD FRML MDRD: 53 ML/MIN/1.73SQ M
GLUCOSE SERPL-MCNC: 101 MG/DL (ref 65–140)
GLUCOSE UR STRIP-MCNC: NEGATIVE MG/DL
HCT VFR BLD AUTO: 44 % (ref 34.8–46.1)
HGB BLD-MCNC: 14.7 G/DL (ref 11.5–15.4)
HGB UR QL STRIP.AUTO: ABNORMAL
IMM GRANULOCYTES # BLD AUTO: 0.02 THOUSAND/UL (ref 0–0.2)
IMM GRANULOCYTES NFR BLD AUTO: 0 % (ref 0–2)
KETONES UR STRIP-MCNC: NEGATIVE MG/DL
LEUKOCYTE ESTERASE UR QL STRIP: ABNORMAL
LIPASE SERPL-CCNC: 25 U/L (ref 11–82)
LYMPHOCYTES # BLD AUTO: 1.51 THOUSANDS/ÂΜL (ref 0.6–4.47)
LYMPHOCYTES NFR BLD AUTO: 27 % (ref 14–44)
MCH RBC QN AUTO: 31.6 PG (ref 26.8–34.3)
MCHC RBC AUTO-ENTMCNC: 33.4 G/DL (ref 31.4–37.4)
MCV RBC AUTO: 95 FL (ref 82–98)
MONOCYTES # BLD AUTO: 0.56 THOUSAND/ÂΜL (ref 0.17–1.22)
MONOCYTES NFR BLD AUTO: 10 % (ref 4–12)
NEUTROPHILS # BLD AUTO: 3.37 THOUSANDS/ÂΜL (ref 1.85–7.62)
NEUTS SEG NFR BLD AUTO: 59 % (ref 43–75)
NITRITE UR QL STRIP: NEGATIVE
NON-SQ EPI CELLS URNS QL MICRO: NORMAL /HPF
NRBC BLD AUTO-RTO: 0 /100 WBCS
PH UR STRIP.AUTO: 6 [PH]
PLATELET # BLD AUTO: 151 THOUSANDS/UL (ref 149–390)
PMV BLD AUTO: 10.3 FL (ref 8.9–12.7)
POTASSIUM SERPL-SCNC: 4.4 MMOL/L (ref 3.5–5.3)
PROT SERPL-MCNC: 7.3 G/DL (ref 6.4–8.4)
PROT UR STRIP-MCNC: NEGATIVE MG/DL
RBC # BLD AUTO: 4.65 MILLION/UL (ref 3.81–5.12)
RBC #/AREA URNS AUTO: NORMAL /HPF
SODIUM SERPL-SCNC: 138 MMOL/L (ref 135–147)
SP GR UR STRIP.AUTO: 1.02 (ref 1–1.03)
UROBILINOGEN UR STRIP-ACNC: <2 MG/DL
WBC # BLD AUTO: 5.68 THOUSAND/UL (ref 4.31–10.16)
WBC #/AREA URNS AUTO: NORMAL /HPF

## 2023-10-23 PROCEDURE — 85025 COMPLETE CBC W/AUTO DIFF WBC: CPT | Performed by: EMERGENCY MEDICINE

## 2023-10-23 PROCEDURE — 96361 HYDRATE IV INFUSION ADD-ON: CPT

## 2023-10-23 PROCEDURE — 74177 CT ABD & PELVIS W/CONTRAST: CPT

## 2023-10-23 PROCEDURE — 81001 URINALYSIS AUTO W/SCOPE: CPT

## 2023-10-23 PROCEDURE — 93005 ELECTROCARDIOGRAM TRACING: CPT

## 2023-10-23 PROCEDURE — 96374 THER/PROPH/DIAG INJ IV PUSH: CPT

## 2023-10-23 PROCEDURE — 36415 COLL VENOUS BLD VENIPUNCTURE: CPT

## 2023-10-23 PROCEDURE — 83690 ASSAY OF LIPASE: CPT | Performed by: EMERGENCY MEDICINE

## 2023-10-23 PROCEDURE — 99285 EMERGENCY DEPT VISIT HI MDM: CPT | Performed by: EMERGENCY MEDICINE

## 2023-10-23 PROCEDURE — 71046 X-RAY EXAM CHEST 2 VIEWS: CPT

## 2023-10-23 PROCEDURE — 80053 COMPREHEN METABOLIC PANEL: CPT | Performed by: EMERGENCY MEDICINE

## 2023-10-23 PROCEDURE — 99284 EMERGENCY DEPT VISIT MOD MDM: CPT

## 2023-10-23 PROCEDURE — 96375 TX/PRO/DX INJ NEW DRUG ADDON: CPT

## 2023-10-23 PROCEDURE — G1004 CDSM NDSC: HCPCS

## 2023-10-23 PROCEDURE — C9113 INJ PANTOPRAZOLE SODIUM, VIA: HCPCS | Performed by: EMERGENCY MEDICINE

## 2023-10-23 PROCEDURE — 84484 ASSAY OF TROPONIN QUANT: CPT | Performed by: EMERGENCY MEDICINE

## 2023-10-23 RX ORDER — MAGNESIUM HYDROXIDE/ALUMINUM HYDROXICE/SIMETHICONE 120; 1200; 1200 MG/30ML; MG/30ML; MG/30ML
30 SUSPENSION ORAL ONCE
Status: COMPLETED | OUTPATIENT
Start: 2023-10-23 | End: 2023-10-23

## 2023-10-23 RX ORDER — FENTANYL CITRATE 50 UG/ML
50 INJECTION, SOLUTION INTRAMUSCULAR; INTRAVENOUS ONCE
Status: COMPLETED | OUTPATIENT
Start: 2023-10-23 | End: 2023-10-23

## 2023-10-23 RX ORDER — PANTOPRAZOLE SODIUM 40 MG/10ML
40 INJECTION, POWDER, LYOPHILIZED, FOR SOLUTION INTRAVENOUS ONCE
Status: COMPLETED | OUTPATIENT
Start: 2023-10-23 | End: 2023-10-23

## 2023-10-23 RX ADMIN — IOHEXOL 100 ML: 350 INJECTION, SOLUTION INTRAVENOUS at 22:59

## 2023-10-23 RX ADMIN — SODIUM CHLORIDE 1000 ML: 0.9 INJECTION, SOLUTION INTRAVENOUS at 22:30

## 2023-10-23 RX ADMIN — PANTOPRAZOLE SODIUM 40 MG: 40 INJECTION, POWDER, FOR SOLUTION INTRAVENOUS at 22:33

## 2023-10-23 RX ADMIN — ALUMINUM HYDROXIDE, MAGNESIUM HYDROXIDE, AND DIMETHICONE 30 ML: 200; 20; 200 SUSPENSION ORAL at 21:30

## 2023-10-23 RX ADMIN — FENTANYL CITRATE 50 MCG: 50 INJECTION INTRAMUSCULAR; INTRAVENOUS at 22:29

## 2023-10-24 VITALS
HEIGHT: 68 IN | OXYGEN SATURATION: 95 % | BODY MASS INDEX: 33.34 KG/M2 | SYSTOLIC BLOOD PRESSURE: 184 MMHG | TEMPERATURE: 99.1 F | HEART RATE: 90 BPM | RESPIRATION RATE: 18 BRPM | DIASTOLIC BLOOD PRESSURE: 108 MMHG | WEIGHT: 220 LBS

## 2023-10-24 LAB
ATRIAL RATE: 70 BPM
P AXIS: 41 DEGREES
PR INTERVAL: 180 MS
QRS AXIS: -57 DEGREES
QRSD INTERVAL: 100 MS
QT INTERVAL: 416 MS
QTC INTERVAL: 449 MS
T WAVE AXIS: 35 DEGREES
VENTRICULAR RATE: 70 BPM

## 2023-10-24 PROCEDURE — 93010 ELECTROCARDIOGRAM REPORT: CPT | Performed by: INTERNAL MEDICINE

## 2023-10-24 RX ORDER — SUCRALFATE 1 G/1
1 TABLET ORAL ONCE
Status: COMPLETED | OUTPATIENT
Start: 2023-10-24 | End: 2023-10-24

## 2023-10-24 RX ORDER — SUCRALFATE ORAL 1 G/10ML
1 SUSPENSION ORAL
Qty: 280 ML | Refills: 0 | Status: SHIPPED | OUTPATIENT
Start: 2023-10-24 | End: 2023-10-30 | Stop reason: HOSPADM

## 2023-10-24 RX ORDER — SUCRALFATE ORAL 1 G/10ML
1 SUSPENSION ORAL ONCE
Status: DISCONTINUED | OUTPATIENT
Start: 2023-10-24 | End: 2023-10-24

## 2023-10-24 RX ADMIN — SUCRALFATE 1 G: 1 TABLET ORAL at 00:51

## 2023-10-24 NOTE — ED PROVIDER NOTES
History  Chief Complaint   Patient presents with    Epigastric Pain     Patient presents to the ED with c/o epigastric pain that began at 1700, states took two pepcid at 1700 with no relief      53-year-old female with history of GERD symptoms complains of epigastric pain that started about 430 this evening. Pain is in the epigastrium and occasionally radiates towards the back. It has been constant and was not relieved with Pepcid. Feels feverish tonight but denies recent illness. Denies bloody stool, nausea, vomiting, diaphoresis and chest pain. Prior to Admission Medications   Prescriptions Last Dose Informant Patient Reported? Taking?   famotidine (PEPCID) 20 mg tablet   No No   Sig: Take 1 tablet (20 mg total) by mouth 2 (two) times a day before meals   Patient taking differently: Take 20 mg by mouth as needed   fexofenadine (ALLEGRA) 180 MG tablet   Yes No   Sig: Take 180 mg by mouth daily as needed   Patient not taking: Reported on 6/30/2023   loratadine (CLARITIN) 10 mg tablet   Yes No   Sig: Take 10 mg by mouth daily as needed for allergies   Patient not taking: Reported on 6/30/2023   multivitamin (THERAGRAN) TABS   Yes No   Sig: Take 1 tablet by mouth daily   sodium picosulfate, magnesium oxide, citric acid (Clenpiq) oral solution   No No   Sig: Take 175 mL (1 bottle) the evening before the colonoscopy, between 5 PM and 9 PM, followed by a second 175 mL bottle 5 hours before the colonoscopy. Facility-Administered Medications: None       History reviewed. No pertinent past medical history.     Past Surgical History:   Procedure Laterality Date    COLONOSCOPY      CYST REMOVAL Right     middle finger on hand    DILATION AND CURETTAGE OF UTERUS         Family History   Problem Relation Age of Onset    Colon cancer Mother     Depression Mother     Substance Abuse Father     Leukemia Maternal Grandmother     Mental illness Neg Hx      I have reviewed and agree with the history as documented. E-Cigarette/Vaping    E-Cigarette Use Never User      E-Cigarette/Vaping Substances    Nicotine No     THC No     CBD No     Flavoring No     Other No     Unknown No      Social History     Tobacco Use    Smoking status: Never    Smokeless tobacco: Never   Vaping Use    Vaping Use: Never used   Substance Use Topics    Alcohol use: No    Drug use: No       Review of Systems   Constitutional:  Positive for appetite change. Negative for fever. HENT:  Negative for sore throat. Respiratory:  Negative for cough and shortness of breath. Cardiovascular:  Negative for chest pain. Gastrointestinal:  Positive for abdominal pain. Negative for blood in stool, diarrhea and vomiting. Genitourinary:  Negative for dysuria and flank pain. Musculoskeletal:  Negative for myalgias. Physical Exam  Physical Exam  Vitals and nursing note reviewed. Constitutional:       General: She is not in acute distress. Appearance: She is well-developed. She is not ill-appearing or diaphoretic. HENT:      Head: Normocephalic and atraumatic. Right Ear: External ear normal.      Left Ear: External ear normal.      Mouth/Throat:      Mouth: Mucous membranes are moist.      Pharynx: Oropharynx is clear. Eyes:      General: No scleral icterus. Conjunctiva/sclera: Conjunctivae normal.      Pupils: Pupils are equal, round, and reactive to light. Cardiovascular:      Rate and Rhythm: Normal rate and regular rhythm. Pulses: Normal pulses. Heart sounds: Normal heart sounds. No murmur heard. Pulmonary:      Effort: Pulmonary effort is normal.      Breath sounds: Normal breath sounds. Abdominal:      General: Bowel sounds are normal.      Palpations: Abdomen is soft. There is no mass. Tenderness: There is no abdominal tenderness. There is no right CVA tenderness, left CVA tenderness, guarding or rebound. Musculoskeletal:         General: Normal range of motion.       Cervical back: Normal range of motion and neck supple. Right lower leg: No edema. Left lower leg: No edema. Skin:     General: Skin is warm and dry. Capillary Refill: Capillary refill takes less than 2 seconds. Findings: No rash. Neurological:      General: No focal deficit present. Mental Status: She is alert and oriented to person, place, and time. Mental status is at baseline. Cranial Nerves: No cranial nerve deficit. Coordination: Coordination normal.      Deep Tendon Reflexes: Reflexes are normal and symmetric.    Psychiatric:         Behavior: Behavior normal.      Comments: anxious         Vital Signs  ED Triage Vitals [10/23/23 1912]   Temperature Pulse Respirations Blood Pressure SpO2   99.1 °F (37.3 °C) 75 18 (!) 213/117 99 %      Temp Source Heart Rate Source Patient Position - Orthostatic VS BP Location FiO2 (%)   Temporal Monitor Sitting Left arm --      Pain Score       8           Vitals:    10/23/23 2200 10/23/23 2245 10/23/23 2315 10/24/23 0030   BP: (!) 196/125  (!) 184/108    Pulse:  70 80 90   Patient Position - Orthostatic VS:             Visual Acuity      ED Medications  Medications   aluminum-magnesium hydroxide-simethicone (MAALOX) oral suspension 30 mL (30 mL Oral Given 10/23/23 2130)   pantoprazole (PROTONIX) injection 40 mg (40 mg Intravenous Given 10/23/23 2233)   sodium chloride 0.9 % bolus 1,000 mL (0 mL Intravenous Stopped 10/23/23 2330)   fentanyl citrate (PF) 100 MCG/2ML 50 mcg (50 mcg Intravenous Given 10/23/23 2229)   iohexol (OMNIPAQUE) 350 MG/ML injection (MULTI-DOSE) 100 mL (100 mL Intravenous Given 10/23/23 2259)   sucralfate (CARAFATE) tablet 1 g (1 g Oral Given 10/24/23 0051)       Diagnostic Studies  Results Reviewed       Procedure Component Value Units Date/Time    Lipase [[id number]]  (Normal) Collected: 10/23/23 1918    Lab Status: Final result Specimen: Blood from Arm, Right Updated: 10/23/23 2137     Lipase 25 u/L     Urine Microscopic [[id number]] Collected: 10/23/23 2041    Lab Status: Final result Specimen: Urine, Clean Catch Updated: 10/23/23 2114     RBC, UA 0-1 /hpf      WBC, UA 0-1 /hpf      Epithelial Cells Occasional /hpf      Bacteria, UA Occasional /hpf      Amorphous Crystals, UA Occasional    UA w Reflex to Microscopic w Reflex to Culture [[id number]]  (Abnormal) Collected: 10/23/23 2041    Lab Status: Final result Specimen: Urine, Clean Catch Updated: 10/23/23 2113     Color, UA Yellow     Clarity, UA Clear     Specific Gravity, UA 1.025     pH, UA 6.0     Leukocytes, UA Moderate     Nitrite, UA Negative     Protein, UA Negative mg/dl      Glucose, UA Negative mg/dl      Ketones, UA Negative mg/dl      Urobilinogen, UA <2.0 mg/dl      Bilirubin, UA Negative     Occult Blood, UA Moderate    HS Troponin 0hr (reflex protocol) [[id number]]  (Normal) Collected: 10/23/23 1918    Lab Status: Final result Specimen: Blood from Arm, Right Updated: 10/23/23 1951     hs TnI 0hr <2 ng/L     Comprehensive metabolic panel [[id number]] Collected: 10/23/23 1918    Lab Status: Final result Specimen: Blood from Arm, Right Updated: 10/23/23 1943     Sodium 138 mmol/L      Potassium 4.4 mmol/L      Chloride 105 mmol/L      CO2 26 mmol/L      ANION GAP 7 mmol/L      BUN 20 mg/dL      Creatinine 1.16 mg/dL      Glucose 101 mg/dL      Calcium 9.7 mg/dL      AST 14 U/L      ALT 12 U/L      Alkaline Phosphatase 62 U/L      Total Protein 7.3 g/dL      Albumin 4.4 g/dL      Total Bilirubin 0.67 mg/dL      eGFR 53 ml/min/1.73sq m     Narrative:      [place] guidelines for Chronic Kidney Disease (CKD):     Stage 1 with normal or high GFR (GFR > 90 mL/min/1.73 square meters)    Stage 2 Mild CKD (GFR = 60-89 mL/min/1.73 square meters)    Stage 3A Moderate CKD (GFR = 45-59 mL/min/1.73 square meters)    Stage 3B Moderate CKD (GFR = 30-44 mL/min/1.73 square meters)    Stage 4 Severe CKD (GFR = 15-29 mL/min/1.73 square meters)    Stage 5 End Stage CKD (GFR <15 mL/min/1.73 square meters)  Note: GFR calculation is accurate only with a steady state creatinine    CBC and differential [[id number]] Collected: 10/23/23 1918    Lab Status: Final result Specimen: Blood from Arm, Right Updated: 10/23/23 1925     WBC 5.68 Thousand/uL      RBC 4.65 Million/uL      Hemoglobin 14.7 g/dL      Hematocrit 44.0 %      MCV 95 fL      MCH 31.6 pg      MCHC 33.4 g/dL      RDW 12.2 %      MPV 10.3 fL      Platelets 869 Thousands/uL      nRBC 0 /100 WBCs      Neutrophils Relative 59 %      Immat GRANS % 0 %      Lymphocytes Relative 27 %      Monocytes Relative 10 %      Eosinophils Relative 3 %      Basophils Relative 1 %      Neutrophils Absolute 3.37 Thousands/µL      Immature Grans Absolute 0.02 Thousand/uL      Lymphocytes Absolute 1.51 Thousands/µL      Monocytes Absolute 0.56 Thousand/µL      Eosinophils Absolute 0.18 Thousand/µL      Basophils Absolute 0.04 Thousands/µL     Odon draw [[id number]] Collected: 10/23/23 1918    Lab Status: Final result Specimen: Blood from Arm, Right Updated: 10/23/23 1923    Narrative: The following orders were created for panel order [place] draw. Procedure                               Abnormality         Status                     ---------                               -----------         ------                     Selena [place] Top on [facility][[id number]]                           Final result               Gold top on [facility][[id number]]                                 Final result                 Please view results for these tests on the individual orders. CT abdomen pelvis with contrast   Final Result by Juli Quick MD (10/23 7344)      Mild fat stranding adjacent to the proximal aspect of the descending duodenum. This is nonspecific and could be related to duodenitis or underlying peptic ulcer disease. Small hiatal hernia. Probable tiny gallstones. No evidence of acute cholecystitis.             Workstation performed: [id number]         XR chest 2 views   ED Interpretation by Damian Choudhury DO (10/23 2127)   No acute cardiopulmonary disease      Final Result by Lubna Ramon MD (10/24 1054)      No acute findings. Workstation performed: [id number]                    Procedures  ECG 12 Lead Documentation Only    Date/Time: 10/23/2023 9:19 PM    Performed by: Damian Choudhury DO  Authorized by: Damian Choudhury DO    ECG reviewed by me, the ED Provider: yes    Patient location:  ED  Previous ECG:     Previous ECG:  Unavailable  Rhythm:     Rhythm: sinus rhythm    Ectopy:     Ectopy: none    QRS:     QRS axis:  Left    QRS intervals:  Normal  Conduction:     Conduction: abnormal      Abnormal conduction: incomplete RBBB and LAFB    ST segments:     ST segments:  Normal  T waves:     T waves: normal    Other findings:     Other findings: poor R wave progression             ED Course  ED Course as of 10/25/23 0905   Tue Oct 24, 2023   0017 /96. Patient states she is feeling better. She does appear to be in less pain. I reviewed all results with her and her . Will prescribe Carafate and instruct her to also take omeprazole OTC and speak to her GI doctor in the morning. HEART Risk Score      Flowsheet Row Most Recent Value   Heart Score Risk Calculator    History 0 Filed at: 10/25/2023 0905   ECG 0 Filed at: 10/25/2023 3228   Age 1 Filed at: 10/25/2023 6510   Risk Factors 0 Filed at: 10/25/2023 0905   Troponin 0 Filed at: 10/25/2023 9060   HEART Score 1 Filed at: 10/25/2023 0238                          SBIRT 20yo+      Flowsheet Row Most Recent Value   Initial Alcohol Screen: US AUDIT-C     1. How often do you have a drink containing alcohol? 0 Filed at: 10/23/2023 1914   2. How many drinks containing alcohol do you have on a typical day you are drinking? 0 Filed at: 10/23/2023 1914   3a. Male UNDER 65: How often do you have five or more drinks on one occasion?  0 Filed at: 10/23/2023 1914   3b. FEMALE Any Age, or MALE 65+: How often do you have 4 or more drinks on one occassion? 0 Filed at: 10/23/2023 1914   Audit-C Score 0 Filed at: 10/23/2023 1914   JF: How many times in the past year have you. .. Used an illegal drug or used a prescription medication for non-medical reasons? Never Filed at: 10/23/2023 [address]  49-year-old female with history of GERD complains of epigastric pain. No objective fever. No peritoneal signs. Doubt cardiopulmonary etiology. Concern for acute gastritis, peptic ulcer disease, pancreatitis, biliary colic. No history of vasculopathies. We will check labs, possibly imaging, administer IV PPI and reassess. Amount and/or Complexity of Data Reviewed  Labs: ordered. Radiology: ordered and independent interpretation performed. Risk  OTC drugs. Prescription drug management. Disposition  Final diagnoses:   Duodenitis   High blood pressure     Time reflects when diagnosis was documented in both MDM as applicable and the Disposition within this note       Time User Action Codes Description Comment    10/24/2023 12:18 AM Estevan Armas Add [K29.80] Duodenitis     10/24/2023 12:18 AM Estevan Armas Add [I10] High blood pressure           ED Disposition       ED Disposition   Discharge    Condition   Stable    Date/Time   Tue Oct 24, 2023 [address] [place] [address] discharge to home/self care.                    Follow-up Information       Follow up With Specialties Details Why Contact Info    Your gastroenterology group  Call in 1 day              Discharge Medication List as of 10/24/2023 12:25 AM        START taking these medications    Details   sucralfate (CARAFATE) 1 g/10 mL suspension Take 10 mL (1 g total) by mouth 4 (four) times a day (with meals and at bedtime) for 7 days, Starting Tue 10/24/2023, Until Tue 10/31/2023, Normal           CONTINUE these medications which have NOT CHANGED    Details   famotidine (PEPCID) 20 mg tablet Take 1 tablet (20 mg total) by mouth 2 (two) times a day before meals, Starting Fri 2/2/2018, OTC      fexofenadine (ALLEGRA) 180 MG tablet Take 180 mg by mouth daily as needed, Historical Med      loratadine (CLARITIN) 10 mg tablet Take 10 mg by mouth daily as needed for allergies, Historical Med      multivitamin (THERAGRAN) TABS Take 1 tablet by mouth daily, Historical Med      sodium picosulfate, magnesium oxide, citric acid (Clenpiq) oral solution Take 175 mL (1 bottle) the evening before the colonoscopy, between 5 PM and 9 PM, followed by a second 175 mL bottle 5 hours before the colonoscopy., Normal             No discharge procedures on file.     PDMP Review       None            ED Provider  Electronically Signed by             Ayah Sparks DO  10/25/23 3879

## 2023-10-24 NOTE — DISCHARGE INSTRUCTIONS
CT findings:    Mild fat stranding adjacent to the proximal aspect of the descending duodenum. This is nonspecific and could be related to duodenitis or underlying peptic ulcer disease. Small hiatal hernia. Probable tiny gallstones. No evidence of acute cholecystitis. Start taking omeprazole OTC as directed. Start carafate as directed.     Contact your GI group tomorrow for further instructions

## 2023-10-25 ENCOUNTER — NURSE TRIAGE (OUTPATIENT)
Age: 53
End: 2023-10-25

## 2023-10-25 DIAGNOSIS — K21.9 GASTROESOPHAGEAL REFLUX DISEASE, UNSPECIFIED WHETHER ESOPHAGITIS PRESENT: Primary | ICD-10-CM

## 2023-10-25 RX ORDER — OMEPRAZOLE 20 MG/1
20 CAPSULE, DELAYED RELEASE ORAL DAILY
Qty: 30 CAPSULE | Refills: 2 | Status: SHIPPED | OUTPATIENT
Start: 2023-10-25

## 2023-10-25 NOTE — TELEPHONE ENCOUNTER
Patient calling in, reports she was in the ED on 10/23 for epigastric pain and no relief with Pepcid. She is scheduled for EGD/ colonoscopy on 10/30 but worried bowel prep will irritate her stomach. She would like to start on omeprazole daily as well and will use Pepcid at night if needed. She reports maalox and carafate worsen her abd pain so she stopped using it. I reassured patient and advised her EGD/ colonoscopy will be beneficial for her to have done and if provider agrees to send in omeprazole daily.    Reason for Disposition  • The patient has epigastric pain for <3 months    Protocols used: GERD

## 2023-10-25 NOTE — TELEPHONE ENCOUNTER
I sent in the omeprazole however pt does warrant scopes so please have her keep this appt for next week. Thanks !

## 2023-10-25 NOTE — TELEPHONE ENCOUNTER
----- Message from [employer] sent at 10/25/2023 10:57 AM EDT -----  Pt called in stating that she was having abd pain, she tried heating pads and holding off but ended up going to the ER. A CT Scan was completed and showed a small hiatal hernia and her duodenum is inflamed. She was given carafate and it doesn't seem to be agreeing with her. She is taking omeprazole that her husbands has and it has been working for her. She is concerned about her upcoming procedure on 10/23/23 and having to take the prep. She doesn't want for the prep to possibly irritate her. She wants some possible recommendations as she does not what to reschedule her procedure.

## 2023-10-30 ENCOUNTER — ANESTHESIA (OUTPATIENT)
Dept: GASTROENTEROLOGY | Facility: AMBULATORY SURGERY CENTER | Age: 53
End: 2023-10-30

## 2023-10-30 ENCOUNTER — HOSPITAL ENCOUNTER (OUTPATIENT)
Dept: GASTROENTEROLOGY | Facility: AMBULATORY SURGERY CENTER | Age: 53
Discharge: HOME | End: 2023-10-30
Payer: COMMERCIAL

## 2023-10-30 ENCOUNTER — ANESTHESIA EVENT (OUTPATIENT)
Dept: GASTROENTEROLOGY | Facility: AMBULATORY SURGERY CENTER | Age: 53
End: 2023-10-30

## 2023-10-30 VITALS
OXYGEN SATURATION: 99 % | HEART RATE: 61 BPM | SYSTOLIC BLOOD PRESSURE: 140 MMHG | DIASTOLIC BLOOD PRESSURE: 90 MMHG | BODY MASS INDEX: 33.74 KG/M2 | WEIGHT: 215 LBS | TEMPERATURE: 98.5 F | RESPIRATION RATE: 16 BRPM | HEIGHT: 67 IN

## 2023-10-30 DIAGNOSIS — K21.9 GASTROESOPHAGEAL REFLUX DISEASE, UNSPECIFIED WHETHER ESOPHAGITIS PRESENT: ICD-10-CM

## 2023-10-30 DIAGNOSIS — R10.10 UPPER ABDOMINAL PAIN: Primary | ICD-10-CM

## 2023-10-30 DIAGNOSIS — K62.5 BRIGHT RED RECTAL BLEEDING: ICD-10-CM

## 2023-10-30 PROCEDURE — 45378 DIAGNOSTIC COLONOSCOPY: CPT | Performed by: INTERNAL MEDICINE

## 2023-10-30 PROCEDURE — 43239 EGD BIOPSY SINGLE/MULTIPLE: CPT | Performed by: INTERNAL MEDICINE

## 2023-10-30 PROCEDURE — 88305 TISSUE EXAM BY PATHOLOGIST: CPT | Performed by: PATHOLOGY

## 2023-10-30 RX ORDER — LIDOCAINE HYDROCHLORIDE 10 MG/ML
INJECTION, SOLUTION EPIDURAL; INFILTRATION; INTRACAUDAL; PERINEURAL AS NEEDED
Status: DISCONTINUED | OUTPATIENT
Start: 2023-10-30 | End: 2023-10-30

## 2023-10-30 RX ORDER — PROPOFOL 10 MG/ML
INJECTION, EMULSION INTRAVENOUS AS NEEDED
Status: DISCONTINUED | OUTPATIENT
Start: 2023-10-30 | End: 2023-10-30

## 2023-10-30 RX ORDER — SODIUM CHLORIDE 9 MG/ML
50 INJECTION, SOLUTION INTRAVENOUS CONTINUOUS
Status: DISCONTINUED | OUTPATIENT
Start: 2023-10-30 | End: 2023-11-03 | Stop reason: HOSPADM

## 2023-10-30 RX ADMIN — SODIUM CHLORIDE 50 ML/HR: 9 INJECTION, SOLUTION INTRAVENOUS at 12:55

## 2023-10-30 RX ADMIN — PROPOFOL 50 MG: 10 INJECTION, EMULSION INTRAVENOUS at 13:20

## 2023-10-30 RX ADMIN — PROPOFOL 50 MG: 10 INJECTION, EMULSION INTRAVENOUS at 13:12

## 2023-10-30 RX ADMIN — LIDOCAINE HYDROCHLORIDE 50 MG: 10 INJECTION, SOLUTION EPIDURAL; INFILTRATION; INTRACAUDAL; PERINEURAL at 13:08

## 2023-10-30 RX ADMIN — PROPOFOL 50 MG: 10 INJECTION, EMULSION INTRAVENOUS at 13:14

## 2023-10-30 RX ADMIN — PROPOFOL 50 MG: 10 INJECTION, EMULSION INTRAVENOUS at 13:24

## 2023-10-30 RX ADMIN — PROPOFOL 50 MG: 10 INJECTION, EMULSION INTRAVENOUS at 13:10

## 2023-10-30 RX ADMIN — PROPOFOL 50 MG: 10 INJECTION, EMULSION INTRAVENOUS at 13:08

## 2023-10-30 NOTE — H&P
History and Physical - [facility]  Katelyn Kaufman 52 y.o. female MRN: [id number]    HPI: Katelyn Kaufman is a 52y.o. year old female who presents for EGD and colonoscopy secondary to abdominal pain, abnormal CAT scan, rectal bleeding, and family history of colon cancer    REVIEW OF SYSTEMS: Per the HPI, and otherwise unremarkable.     Historical Information   Past Medical History:   Diagnosis Date    GERD (gastroesophageal reflux disease)     Hiatal hernia      Past Surgical History:   Procedure Laterality Date    COLONOSCOPY      CYST REMOVAL Right     middle finger on hand    DILATION AND CURETTAGE OF UTERUS       Social History   Social History     Substance and Sexual Activity   Alcohol Use No     Social History     Substance and Sexual Activity   Drug Use Yes    Comment: social     Social History     Tobacco Use   Smoking Status Never   Smokeless Tobacco Never     Family History   Problem Relation Age of Onset    Colon cancer Mother     Depression Mother     Substance Abuse Father     Leukemia Maternal Grandmother     Mental illness Neg Hx        Meds/Allergies       Current Outpatient Medications:     famotidine (PEPCID) 20 mg tablet    omeprazole (PriLOSEC) 20 mg delayed release capsule    fexofenadine (ALLEGRA) 180 MG tablet    loratadine (CLARITIN) 10 mg tablet    multivitamin (THERAGRAN) TABS    sodium picosulfate, magnesium oxide, citric acid (Clenpiq) oral solution    sucralfate (CARAFATE) 1 g/10 mL suspension    Current Facility-Administered Medications:     sodium chloride 0.9 % infusion, 50 mL/hr, Intravenous, Continuous, 50 mL/hr at 10/30/23 1255    Allergies   Allergen Reactions    Penicillins      Pt is unaware what reaction    Seasonal Ic [Octacosanol] Allergic Rhinitis       Objective     /91   Pulse 67   Temp 98.5 °F (36.9 °C) (Temporal)   Resp 17   Ht 5' 7" (1.702 m)   Wt 97.5 kg (215 lb)   SpO2 99%   BMI 33.67 kg/m²     PHYSICAL EXAM    Gen: NAD AAOx3  Head: Normocephalic, Atraumatic  CV: S1S2 RRR no m/r/g  CHEST: Clear b/l no c/r/w  ABD: soft, +BS NT/ND  EXT: no edema    ASSESSMENT/PLAN:  This is a 52y.o. year old female here for EGD and colonoscopy, and she is stable and optimized for her procedure.

## 2023-10-30 NOTE — ANESTHESIA PREPROCEDURE EVALUATION
Procedure:  COLONOSCOPY  EGD    Relevant Problems   No relevant active problems   GERD     Physical Exam    Airway    Mallampati score: II  TM Distance: >3 FB  Neck ROM: full     Dental   No notable dental hx     Cardiovascular      Pulmonary      Other Findings        Anesthesia Plan  ASA Score- 2     Anesthesia Type- IV sedation with anesthesia with ASA Monitors. Additional Monitors:     Airway Plan:            Plan Factors-Exercise tolerance (METS): >4 METS. Chart reviewed. Patient summary reviewed. Patient is not a current smoker. Induction- intravenous. Postoperative Plan-     Informed Consent- Anesthetic plan and risks discussed with patient. I personally reviewed this patient with the CRNA. Discussed and agreed on the Anesthesia Plan with the CRNA. Jenna Casarez

## 2023-11-03 PROCEDURE — 88305 TISSUE EXAM BY PATHOLOGIST: CPT | Performed by: PATHOLOGY

## 2023-11-07 ENCOUNTER — HOSPITAL ENCOUNTER (OUTPATIENT)
Dept: ULTRASOUND IMAGING | Facility: CLINIC | Age: 53
Discharge: HOME | End: 2023-11-07
Payer: COMMERCIAL

## 2023-11-07 DIAGNOSIS — R10.10 UPPER ABDOMINAL PAIN: ICD-10-CM

## 2023-11-07 PROCEDURE — 76705 ECHO EXAM OF ABDOMEN: CPT

## 2023-11-13 ENCOUNTER — TELEPHONE (OUTPATIENT)
Age: 53
End: 2023-11-13

## 2023-11-13 NOTE — TELEPHONE ENCOUNTER
Patients GI provider:  Jesse Arteaga    Number to return call: [phone]    Reason for call: Pt calling asking about US results from 11/7. Study is not resulted at this time. She wants to make sure they are available for her appointment. Please call her is she should reschedule ov.     Scheduled procedure/appointment date if applicable:  51/19/56

## 2023-11-16 ENCOUNTER — OFFICE VISIT (OUTPATIENT)
Dept: GASTROENTEROLOGY | Facility: CLINIC | Age: 53
End: 2023-11-16
Payer: COMMERCIAL

## 2023-11-16 VITALS
TEMPERATURE: 98.6 F | DIASTOLIC BLOOD PRESSURE: 70 MMHG | BODY MASS INDEX: 34.62 KG/M2 | HEIGHT: 67 IN | SYSTOLIC BLOOD PRESSURE: 128 MMHG | WEIGHT: 220.6 LBS

## 2023-11-16 DIAGNOSIS — K83.8 DILATED CBD, ACQUIRED: Primary | ICD-10-CM

## 2023-11-16 PROCEDURE — 99214 OFFICE O/P EST MOD 30 MIN: CPT | Performed by: PHYSICIAN ASSISTANT

## 2023-11-16 NOTE — PROGRESS NOTES
[facility] - Outpatient Follow-up Note  Masha Branch 52 y.o. female MRN: [id number]  Encounter: [id number]          ASSESSMENT AND PLAN:      Scarlet Manzanares is a 51 y/o female who presents for follow-up. 1. Dilated cbd with cholelithiasis   2. Epigastric Pain  3. GERD  Pt says that on 10/23, she started to have 10/10 epigastric pain that was not alleviating with pepcid so she went to the ED. CT noted gallstones with dilated CBD and potential duodenitis. She underwent EGD 7 days later, which noted large duodenal diverticulum without abnormalities but was otherwise parminder; gastric bx negative for H.pylori. Abdominal u/s was then done that again confirmed cholelithiasis and dilated CBD to 9 mm. LFTs and CBC WNL. Today: she says the pain is still presents but is going down with each  passing day. -MRI/MRCP ordered to be done ASAP   -explained that I suspect she was passing a stone, which caused the pain, so if MRI/MRCP does not show any biliary etiology, would recommend seeing the gen surgery team at that time  -continue omeprazole 20 mg daily  for now    -explained to pt that if MRI/MRCP is normal, we will refer to gen surgery but we can also consider GES if she remains symptomatic     -strict ED precautions: if you start to have fevers, chills, 10/10 pain, n/v, presyncope or syncope     ______________________________________________________________________    SUBJECTIVE:  Scarlet Manzanares is a 51 y/o female who presents for follow-up. Pt says that 7 days before her EGD/Colonoscopy, she started to have 10/10 epigastric pain that was not getting better with pepcid. She says that she went to the ED and was given fentanyl as IV PPI was not helping the pain. She says that since then, her pain has been slowly getting better with each day but is still present. She says she thinks eating does worsen the pain at times but not always.  She denies n/v or heartburn, constipation, diarrhea, unintentional weight loss, fevers, chills, night sweats, NSAID use, bloody or black BM. REVIEW OF SYSTEMS IS OTHERWISE NEGATIVE. Historical Information   Past Medical History:   Diagnosis Date    GERD (gastroesophageal reflux disease)     Hiatal hernia      Past Surgical History:   Procedure Laterality Date    COLONOSCOPY      CYST REMOVAL Right     middle finger on hand    DILATION AND CURETTAGE OF UTERUS       Social History   Social History     Substance and Sexual Activity   Alcohol Use No     Social History     Substance and Sexual Activity   Drug Use Yes    Comment: social     Social History     Tobacco Use   Smoking Status Never   Smokeless Tobacco Never     Family History   Problem Relation Age of Onset    Colon cancer Mother     Depression Mother     Substance Abuse Father     Leukemia Maternal Grandmother     Mental illness Neg Hx        Meds/Allergies       Current Outpatient Medications:     famotidine (PEPCID) 20 mg tablet    multivitamin (THERAGRAN) TABS    omeprazole (PriLOSEC) 20 mg delayed release capsule    fexofenadine (ALLEGRA) 180 MG tablet    loratadine (CLARITIN) 10 mg tablet    Allergies   Allergen Reactions    Penicillins      Pt is unaware what reaction    Seasonal Ic [Octacosanol] Allergic Rhinitis           Objective     Blood pressure 128/70, temperature 98.6 °F (37 °C), temperature source Tympanic, height 5' 7" (1.702 m), weight 100 kg (220 lb 9.6 oz). Body mass index is 34.55 kg/m². PHYSICAL EXAM:      General Appearance:   Alert, cooperative, no distress   HEENT:   Normocephalic, atraumatic, anicteric. Neck:  Supple, symmetrical, trachea midline   Lungs:   Clear to auscultation bilaterally; no rales, rhonchi or wheezing; respirations unlabored    Heart[de-identified]   Regular rate and rhythm; no murmur, rub, or gallop.    Abdomen:   Soft, non-tender, non-distended; normal bowel sounds; no masses, no organomegaly    Genitalia:   Deferred    Rectal:   Deferred    Extremities:  No cyanosis, clubbing or edema    Pulses:  2+ and symmetric    Skin:  No jaundice, rashes, or lesions    Lymph nodes:  No palpable cervical lymphadenopathy        Lab Results:   No visits with results within 1 Day(s) from this visit. Latest known visit with results is:   Hospital Outpatient Visit on 10/30/2023   Component Date Value    Case Report 10/30/2023                      Value:Surgical Pathology Report                         Case: [id number]                                   Authorizing Provider:  Tae Waters MD      Collected:           10/30/2023 1336              Ordering Location:     [address] [place] [address] Received:            10/30/2023 1951              Pathologist:           Dahlen Market, MD                                                                Specimen:    Stomach, antral bx-r/o hpylori                                                             Final Diagnosis 10/30/2023                      Value: This result contains rich text formatting which cannot be displayed here. Additional Information 10/30/2023                      Value: This result contains rich text formatting which cannot be displayed here. Gross Description 10/30/2023                      Value: This result contains rich text formatting which cannot be displayed here. Radiology Results:   US right upper quadrant    Result Date: 11/13/2023  Narrative: RIGHT UPPER QUADRANT ULTRASOUND INDICATION:     R10.10: Upper abdominal pain, unspecified. COMPARISON: CT abdomen pelvis 10/23/2023 TECHNIQUE:   Real-time ultrasound of the right upper quadrant was performed with a curvilinear transducer with both volumetric sweeps and still imaging techniques. FINDINGS: PANCREAS:  Visualized portions of the pancreas are within normal limits. AORTA AND IVC:  Visualized portions are normal for patient age. LIVER: Size:  Within normal range. The liver measures 13.1 cm in the midclavicular line. Contour:  Surface contour is smooth.  Parenchyma: Echogenicity and echotexture are within normal limits. No liver mass identified. Limited imaging of the main portal vein shows it to be patent and hepatopetal. BILIARY: The gallbladder is normal in caliber. No wall thickening or pericholecystic fluid. Shadowing gallstone(s) identified. No sonographic Townsend's sign. No intrahepatic biliary dilatation. CBD measures 9.0 mm, similar to CT dated 10/23/2023. No choledocholithiasis. KIDNEY: Right kidney measures 11.0 x 5.6 x 5.1 cm. Volume 163.3 mL Kidney within normal limits. ASCITES:   None. Impression: 1. Cholelithiasis without sonographic evidence of acute cholecystitis. 2. Maximum diameter of common bile duct is 9 mm, which is similar to prior CT dated 10/23/2023. Correlate with liver function tests. Resident: Tesfaye Morris, the attending radiologist, have reviewed the images and agree with the final report above. Workstation performed: [id number]     Colonoscopy    Result Date: 10/30/2023  Narrative: Table formatting from the original result was not included. [address] [facility] [address] [facility] [phone] DATE OF SERVICE: 10/30/23 PHYSICIAN(S): Attending: Gordon Tejeda MD Fellow: No Staff Documented INDICATION: Bright red rectal bleeding Family history of colon cancer POST-OP DIAGNOSIS: See the impression below. HISTORY: Prior colonoscopy: 4 years ago. BOWEL PREPARATION: Clenpiq PREPROCEDURE: Informed consent was obtained for the procedure, including sedation. Risks including but not limited to bleeding, infection, perforation, adverse drug reaction and aspiration were explained in detail. Also explained about less than 100% sensitivity with the exam and other alternatives. The patient was placed in the left lateral decubitus position. Procedure: Colonoscopy DETAILS OF PROCEDURE: Patient was taken to the procedure room where a time out was performed to confirm correct patient and correct procedure. The patient underwent monitored anesthesia care, which was administered by an anesthesia professional. The patient's blood pressure, heart rate, level of consciousness, oxygen, respirations and ECG were monitored throughout the procedure. A digital rectal exam was performed. The scope was introduced through the anus and advanced to the cecum. Retroflexion was performed in the cecum and rectum. The quality of bowel preparation was evaluated using the [facility] Bowel Preparation Scale with scores of: right colon = 2, transverse colon = 2, left colon = 2. The total BBPS score was 6. Bowel prep was adequate. The patient experienced no blood loss. The procedure was not difficult. The patient tolerated the procedure well. There were no apparent adverse events. ANESTHESIA INFORMATION: ASA: II Anesthesia Type: IV Sedation with Anesthesia MEDICATIONS: sodium chloride 0.9 % infusion Not documented*  *Total volume has not been documented. View each administration to see the amount administered. (Totals for administrations occurring from 1238 to 1338 on 10/30/23) FINDINGS: Diverticula of mild severity in the sigmoid colon Internal small hemorrhoids EVENTS: Procedure Events Event Event Time ENDO SCOPE OUT TIME 10/30/2023  1:15 PM ENDO CECUM REACHED 10/30/2023  1:25 PM ENDO SCOPE OUT TIME 10/30/2023  1:32 PM SPECIMENS: ID Type Source Tests Collected by Time Destination 1 : antral bx-r/o hpylori Tissue Stomach TISSUE EXAM Palma Erazo MD 10/30/2023  1:36 PM  EQUIPMENT: Colonoscope -[id number]     Impression: Mild sigmoid diverticulosis Small internal hemorrhoids No polyps or malignancies RECOMMENDATION: Resume regular diet and medications  Repeat colonoscopy in 5 years  Family history of colon cancer    Palma Erazo MD     EGD    Result Date: 10/30/2023  Narrative: Table formatting from the original result was not included.  [address] [phone] DATE OF SERVICE: 10/30/23 PHYSICIAN(S): Attending: Breezy Honeycutt MD Fellow: No Staff Documented INDICATION: Gastroesophageal reflux disease, unspecified whether esophagitis present POST-OP DIAGNOSIS: See the impression below. PREPROCEDURE: Informed consent was obtained for the procedure, including sedation. Risks of perforation, hemorrhage, adverse drug reaction and aspiration were discussed. The patient was placed in the left lateral decubitus position. Patient was explained about the risks and benefits of the procedure. Risks including but not limited to bleeding, infection, and perforation were explained in detail. Also explained about less than 100% sensitivity with the exam and other alternatives. PROCEDURE: EGD DETAILS OF PROCEDURE: Patient was taken to the procedure room where a time out was performed to confirm correct patient and correct procedure. The patient underwent monitored anesthesia care, which was administered by an anesthesia professional. The patient's blood pressure, heart rate, level of consciousness, respirations and oxygen were monitored throughout the procedure. The scope was advanced to the second part of the duodenum. Retroflexion was performed in the fundus. The patient experienced no blood loss. The procedure was not difficult. The patient tolerated the procedure well. There were no apparent adverse events. ANESTHESIA INFORMATION: ASA: II Anesthesia Type: IV Sedation with Anesthesia MEDICATIONS: sodium chloride 0.9 % infusion Not documented*  *Total volume has not been documented. View each administration to see the amount administered.  (Totals for administrations occurring from 1238 to 1337 on 10/30/23) FINDINGS: Single large diverticulum in the 3rd part of the duodenum The duodenum appeared normal. Mild erythematous mucosa in the antrum; performed cold forceps biopsy The esophagus appeared normal. SPECIMENS: ID Type Source Tests Collected by Time Destination 1 : antral bx-r/o hpylori Tissue Stomach TISSUE EXAM John Jones MD 10/30/2023  1:36 PM      Impression: Large duodenal diverticulum in the third part of the duodenum without evidence of inflammation or ulcerations Mild antral gastritis but otherwise normal stomach. Biopsies taken Normal esophagus RECOMMENDATION: Resume regular diet and medications I will call with biopsy results in 1 to 2 weeks Okay to increase omeprazole to twice a day if needed Abdominal ultrasound Follow-up in the office to schedule   John Jones MD     XR chest 2 views    Result Date: 10/24/2023  Narrative: CHEST INDICATION:   Chest Pain. COMPARISON:  None EXAM PERFORMED/VIEWS:  XR CHEST PA & LATERAL FINDINGS: Cardiomediastinal silhouette appears unremarkable. The lungs are clear. No pneumothorax or pleural effusion. Osseous structures appear within normal limits for patient age. Impression: No acute findings. Workstation performed: [id number]     CT abdomen pelvis with contrast    Result Date: 10/23/2023  Narrative: CT ABDOMEN AND PELVIS WITH IV CONTRAST INDICATION:   Epigastric pain RUQ pain. COMPARISON:  None. TECHNIQUE:  CT examination of the abdomen and pelvis was performed. Multiplanar 2D reformatted images were created from the source data. This examination, like all CT scans performed in the [facility], was performed utilizing techniques to minimize radiation dose exposure, including the use of iterative reconstruction and automated exposure control. Radiation dose length product (DLP) for this visit:  590 mGy-cm IV Contrast:  100 mL of iohexol (OMNIPAQUE) Enteric Contrast:  Enteric contrast was not administered. FINDINGS: ABDOMEN LOWER CHEST: Small hiatal hernia noted. Subsegmental atelectasis in the dependent lower lobes. LIVER/BILIARY TREE: Liver is diffusely decreased in density consistent with fatty change. No CT evidence of suspicious hepatic mass. Normal hepatic contours. No biliary dilatation. GALLBLADDER: Probable tiny gallstones. No pericholecystic inflammatory change. SPLEEN:  Unremarkable. PANCREAS:  Unremarkable. ADRENAL GLANDS:  Unremarkable. KIDNEYS/URETERS: No hydronephrosis or urinary tract calculus. One or more sharply circumscribed subcentimeter renal hypodensities are present, too small to accurately characterize, and statistically most likely benign findings. According to recent literature (Radiology 2019) no further workup of these findings is recommended. STOMACH AND BOWEL: Mild fat stranding adjacent to the proximal descending duodenum. Duodenal diverticulum in the horizontal segment without complication. No bowel obstruction. APPENDIX:  No findings to suggest appendicitis. ABDOMINOPELVIC CAVITY:  No ascites. No pneumoperitoneum. No lymphadenopathy. VESSELS:  Unremarkable for patient's age. PELVIS REPRODUCTIVE ORGANS:  Unremarkable for patient's age. URINARY BLADDER:  Unremarkable. ABDOMINAL WALL/INGUINAL REGIONS:  Unremarkable. OSSEOUS STRUCTURES:  No acute fracture or destructive osseous lesion. Impression: Mild fat stranding adjacent to the proximal aspect of the descending duodenum. This is nonspecific and could be related to duodenitis or underlying peptic ulcer disease. Small hiatal hernia. Probable tiny gallstones. No evidence of acute cholecystitis.  Workstation performed: [id number]

## 2023-12-01 ENCOUNTER — TRANSCRIBE ORDERS (OUTPATIENT)
Dept: GASTROENTEROLOGY | Facility: CLINIC | Age: 53
End: 2023-12-01

## 2023-12-06 ENCOUNTER — HOSPITAL ENCOUNTER (OUTPATIENT)
Dept: MRI IMAGING | Facility: HOSPITAL | Age: 53
Discharge: HOME | End: 2023-12-06
Payer: COMMERCIAL

## 2023-12-06 DIAGNOSIS — K83.8 DILATED CBD, ACQUIRED: ICD-10-CM

## 2023-12-06 PROCEDURE — A9585 GADOBUTROL INJECTION: HCPCS | Performed by: PHYSICIAN ASSISTANT

## 2023-12-06 PROCEDURE — G1004 CDSM NDSC: HCPCS

## 2023-12-06 PROCEDURE — 74183 MRI ABD W/O CNTR FLWD CNTR: CPT

## 2023-12-06 RX ORDER — GADOBUTROL 604.72 MG/ML
10 INJECTION INTRAVENOUS
Status: COMPLETED | OUTPATIENT
Start: 2023-12-06 | End: 2023-12-06

## 2023-12-06 RX ADMIN — GADOBUTROL 10 ML: 604.72 INJECTION INTRAVENOUS at 08:12

## 2023-12-12 ENCOUNTER — TELEPHONE (OUTPATIENT)
Dept: GASTROENTEROLOGY | Facility: CLINIC | Age: 53
End: 2023-12-12

## 2023-12-12 NOTE — TELEPHONE ENCOUNTER
----- Message from Ni Kay PA-C sent at 12/12/2023 10:13 AM EST -----  Clinical: please tell pt MRCP noted dilation of CBD but no gallstones stuck in the bile ducts or masses. Please let her know we are having our advanced team review for further work-up, if any. She does indeed of gallstones within the gallbladder. How is she feeling? Thanks ! Advanced team: please review. I suspect this pt will need non-urgent EUS. Thank you!

## 2024-05-09 ENCOUNTER — VBI (OUTPATIENT)
Dept: ADMINISTRATIVE | Facility: OTHER | Age: 54
End: 2024-05-09

## 2024-11-05 ENCOUNTER — TELEPHONE (OUTPATIENT)
Age: 54
End: 2024-11-05

## 2024-11-05 NOTE — TELEPHONE ENCOUNTER
Pt requires AMB Referral for  OPHTHALMOLOGY before insurance referral submission.    Please place referral, and route encounter to [facility] Procedure Prior Authorizations POD for completion.      Pt is requesting an insurance referral for the following:    *Test Name/Order Name:  Ophthalmology f/u appts    *DX Code: H35.342    *Date of Service: 11/4/24 (backdated)    *Location/Facility  Name/Address/Phone#  [facility]  [address] [place] [address]    *Location/Facility NPI:  [id number]    *Best # to Reach Patient:  [phone]

## 2024-11-13 DIAGNOSIS — H35.342 LAMELLAR MACULAR HOLE OF LEFT EYE: Primary | ICD-10-CM

## 2024-11-13 NOTE — TELEPHONE ENCOUNTER
[facility] please have correction on the speciality should have   Pt requires AMB Referral for  OPHTHALMOLOGY before insurance referral submission.     Please place referral, and route encounter to [facility] Procedure Prior Authorizations POD for completion.        Pt is requesting an insurance referral for the following:     *Test Name/Order Name:  Ophthalmology f/u appts     *DX Code: H35.342     *Date of Service: 11/4/24 (backdated)     *Location/Facility  Name/Address/Phone#  [facility]  [address] [place] [address]     *Location/Facility NPI:  [id number]     *Best # to Reach Patient:  [phone]

## 2024-11-13 NOTE — TELEPHONE ENCOUNTER
Referral back dated and entered into pear- printed and faxed to central faxing to put in patients documents

## 2024-11-14 NOTE — TELEPHONE ENCOUNTER
Do from [facility] called and stated that second referral in pear is still not correct and it needs to state Ophthalmology not Optometry.   She said that under provider ID Ophthalmology is option 3.  Please correct specialty.  Any questions call [phone].

## 2025-05-02 ENCOUNTER — VBI (OUTPATIENT)
Dept: ADMINISTRATIVE | Facility: OTHER | Age: 55
End: 2025-05-02

## 2025-05-02 NOTE — TELEPHONE ENCOUNTER
05/02/25 3:21 PM     Chart reviewed for Mammogram and Pap Smear (HPV) aka Cervical Cancer Screening ; nothing is submitted to the patient's insurance at this time.     Efraín Temple MA   [facility]

## 2025-05-23 ENCOUNTER — NURSE TRIAGE (OUTPATIENT)
Age: 55
End: 2025-05-23

## 2025-05-23 NOTE — TELEPHONE ENCOUNTER
"FOLLOW UP: Unable to schedule Pt for the next 3 days as per protocol disposition. Next available appointment seen June 4th. Please review with PCP and return call to Pt to advise further on appointment to assess breast and axilla symptoms.    REASON FOR CONVERSATION: Breast Problem    SYMPTOMS: Left axilla and breast tenderness with possible lump x 1 week. See triage    OTHER: Triage forwarded for PCP review and advice    DISPOSITION: Discuss With PCP and Callback by Nurse Today (overriding See Within 3 Days in Office)          Reason for Disposition   Breast lump    Answer Assessment - Initial Assessment Questions  1. SYMPTOM: \"What's the main symptom you're concerned about?\"  (e.g., lump, nipple discharge, pain, rash )      Pt reports possible \"fat pocket\" like lump to left breast. Tenderness felt in left axilla radiates into left breast.    2. ONSET: \"When did symptoms  start?\"      1 week    3. PRIOR HISTORY: \"Do you have any history of prior problems with your breasts?\" (e.g., breast cancer, breast implant, fibrocystic breast disease)      Pt states that she had an order for mammogram, but was unable to go due to spouse health issues    4. CAUSE: \"What do you think is causing this symptom?\"      Unsure    5. OTHER SYMPTOMS: \"Do you have any other symptoms?\" (e.g., fever, breast pain, nipple discharge, redness or rash)      Pt does not report fever. Denies nipple changes or discharge. Denies redness/rash or warmth to the area.    Protocols used: Breast Symptoms-Adult-[place]    "

## 2025-05-27 ENCOUNTER — OFFICE VISIT (OUTPATIENT)
Dept: FAMILY MEDICINE CLINIC | Facility: CLINIC | Age: 55
End: 2025-05-27
Payer: COMMERCIAL

## 2025-05-27 VITALS
DIASTOLIC BLOOD PRESSURE: 80 MMHG | BODY MASS INDEX: 34.21 KG/M2 | HEIGHT: 67 IN | HEART RATE: 71 BPM | OXYGEN SATURATION: 98 % | TEMPERATURE: 97.7 F | WEIGHT: 218 LBS | RESPIRATION RATE: 15 BRPM | SYSTOLIC BLOOD PRESSURE: 122 MMHG

## 2025-05-27 DIAGNOSIS — Z12.31 ENCOUNTER FOR SCREENING MAMMOGRAM FOR BREAST CANCER: ICD-10-CM

## 2025-05-27 DIAGNOSIS — N64.4 PAIN OF LEFT BREAST: Primary | ICD-10-CM

## 2025-05-27 PROCEDURE — 99213 OFFICE O/P EST LOW 20 MIN: CPT | Performed by: FAMILY MEDICINE

## 2025-05-27 NOTE — PROGRESS NOTES
":  Assessment & Plan  Pain of left breast  - left diagnostic mammogram and US ordered, we will call when the results are available, encouraged to contact the office for any change in symptoms    Orders:    Mammo diagnostic left w 3d and cad; Future    US Breast Axilla Left; Future    Encounter for screening mammogram for breast cancer  Orders:    Mammo screening right w 3d and cad; Future    Return for annual physical with Do in 1-2 months or sooner as needed. Patient understands and agrees with the treatment plan.         History of Present Illness     Leila Herrera is a 54 y.o. female who presents to the office today with c/o left breast pain over lateral aspect that she noted about a week ago. She denies any breast/ axillary lumps, skin rash or redness, nipple discharge, injury to the area or using new bras. Patient had her last mammogram in October 2021.     Review of Systems   Constitutional:  Negative for chills and fever.   Respiratory:  Negative for shortness of breath.    Cardiovascular:  Negative for chest pain.   Skin:  Negative for color change and rash.   Hematological:  Negative for adenopathy.     Objective   /80   Pulse 71   Temp 97.7 °F (36.5 °C)   Resp 15   Ht 5' 7\" (1.702 m)   Wt 98.9 kg (218 lb)   SpO2 98%   BMI 34.14 kg/m²      Physical Exam  Constitutional:       General: She is not in acute distress.  Chest:   Breasts:     Right: No mass, nipple discharge, skin change or tenderness.      Left: No inverted nipple, mass, nipple discharge, skin change or tenderness.   Lymphadenopathy:      Upper Body:      Left upper body: No supraclavicular or axillary adenopathy.     Neurological:      Mental Status: She is alert and oriented to person, place, and time.     Psychiatric:         Mood and Affect: Mood normal.         Behavior: Behavior normal.           "

## 2025-07-24 ENCOUNTER — OFFICE VISIT (OUTPATIENT)
Dept: FAMILY MEDICINE CLINIC | Facility: CLINIC | Age: 55
End: 2025-07-24
Payer: COMMERCIAL

## 2025-07-24 VITALS
SYSTOLIC BLOOD PRESSURE: 114 MMHG | WEIGHT: 218 LBS | HEART RATE: 67 BPM | DIASTOLIC BLOOD PRESSURE: 70 MMHG | HEIGHT: 67 IN | OXYGEN SATURATION: 98 % | RESPIRATION RATE: 16 BRPM | TEMPERATURE: 98.2 F | BODY MASS INDEX: 34.21 KG/M2

## 2025-07-24 DIAGNOSIS — Z00.00 ANNUAL PHYSICAL EXAM: Primary | ICD-10-CM

## 2025-07-24 PROCEDURE — 99386 PREV VISIT NEW AGE 40-64: CPT | Performed by: PHYSICIAN ASSISTANT

## 2025-07-24 NOTE — PROGRESS NOTES
Adult Annual Physical  Name: Leila Herrera      : 1970      MRN: [id number]  Encounter Provider: Do Gregorio PA-C  Encounter Date: 2025   Encounter department: [facility]    :  Assessment & Plan  Annual physical exam    Orders:    Lipid Panel with Direct LDL reflex; Future    Comprehensive metabolic panel; Future    CBC and differential; Future    TSH, 3rd generation with Free T4 reflex; Future    UA (URINE) with reflex to Scope; Future        Preventive Screenings:  - Diabetes Screening: orders placed  - Cholesterol Screening: orders placed   - Hepatitis C screening: screening up-to-date   - Cervical cancer screening: orders placed   - Breast cancer screening: orders placed   - Colon cancer screening: screening up-to-date   - Lung cancer screening: screening not indicated     Counseling/Anticipatory Guidance:    - Dental health: discussed importance of regular tooth brushing, flossing, and dental visits.   - Diet: discussed recommendations for a healthy/well-balanced diet.   - Exercise: the importance of regular exercise/physical activity was discussed. Recommend exercise 3-5 times per week for at least 30 minutes.   - Injury prevention: discussed safety/seat belts, safety helmets, smoke detectors, carbon monoxide detectors, and smoking near bedding or upholstery.          History of Present Illness     Adult Annual Physical:  Patient presents for annual physical.     Diet and Physical Activity:  - Diet/Nutrition: well balanced diet.  - Exercise: strength training exercises and moderate cardiovascular exercise.    Depression Screening:  - PHQ-2 Score: 0    Review of Systems   Constitutional: Negative.    HENT: Negative.     Eyes: Negative.    Respiratory: Negative.     Cardiovascular: Negative.    Gastrointestinal: Negative.    Endocrine: Negative.    Genitourinary: Negative.    Musculoskeletal: Negative.    Skin: Negative.   "  Allergic/Immunologic: Negative.    Neurological: Negative.    Hematological: Negative.    Psychiatric/Behavioral: Negative.       Medical History Reviewed by provider this encounter:     .    Objective   /70   Pulse 67   Temp 98.2 °F (36.8 °C) (Temporal)   Resp 16   Ht 5' 7\" (1.702 m)   Wt 98.9 kg (218 lb)   SpO2 98%   BMI 34.14 kg/m²     Physical Exam  Constitutional:       Appearance: Normal appearance. She is well-developed and normal weight.   HENT:      Head: Normocephalic and atraumatic.      Right Ear: Hearing, tympanic membrane, ear canal and external ear normal.      Left Ear: Hearing, tympanic membrane, ear canal and external ear normal.      Nose: Nose normal.      Mouth/Throat:      Mouth: Mucous membranes are moist.      Pharynx: Oropharynx is clear. Uvula midline.     Eyes:      Extraocular Movements: Extraocular movements intact.      Conjunctiva/sclera: Conjunctivae normal.      Pupils: Pupils are equal, round, and reactive to light.     Neck:      Thyroid: No thyromegaly.     Cardiovascular:      Rate and Rhythm: Normal rate and regular rhythm.      Heart sounds: Normal heart sounds. No murmur heard.  Pulmonary:      Effort: Pulmonary effort is normal.      Breath sounds: Normal breath sounds.   Abdominal:      General: Bowel sounds are normal. There is no distension.      Palpations: Abdomen is soft. There is no mass.      Tenderness: There is no abdominal tenderness.     Musculoskeletal:         General: Normal range of motion.      Cervical back: Normal range of motion and neck supple.   Lymphadenopathy:      Cervical: No cervical adenopathy.     Skin:     General: Skin is warm.     Neurological:      General: No focal deficit present.      Mental Status: She is alert and oriented to person, place, and time.      Cranial Nerves: No cranial nerve deficit.      Deep Tendon Reflexes: Reflexes normal.     Psychiatric:         Mood and Affect: Mood normal.         Behavior: Behavior " normal.         Thought Content: Thought content normal.         Judgment: Judgment normal.

## 2025-08-05 ENCOUNTER — TELEPHONE (OUTPATIENT)
Age: 55
End: 2025-08-05

## 2025-08-05 DIAGNOSIS — L60.0 INGROWING NAIL: Primary | ICD-10-CM
